# Patient Record
Sex: FEMALE | Race: WHITE | Employment: UNEMPLOYED | URBAN - METROPOLITAN AREA
[De-identification: names, ages, dates, MRNs, and addresses within clinical notes are randomized per-mention and may not be internally consistent; named-entity substitution may affect disease eponyms.]

---

## 2018-08-02 ENCOUNTER — RECORDS - HEALTHEAST (OUTPATIENT)
Dept: LAB | Facility: CLINIC | Age: 28
End: 2018-08-02

## 2018-08-02 LAB — TSH SERPL DL<=0.005 MIU/L-ACNC: 2.41 UIU/ML (ref 0.3–5)

## 2018-09-17 ENCOUNTER — THERAPY VISIT (OUTPATIENT)
Dept: PHYSICAL THERAPY | Facility: CLINIC | Age: 28
End: 2018-09-17
Payer: COMMERCIAL

## 2018-09-17 DIAGNOSIS — M25.551 HIP PAIN, RIGHT: Primary | ICD-10-CM

## 2018-09-17 PROCEDURE — 97161 PT EVAL LOW COMPLEX 20 MIN: CPT | Mod: GP | Performed by: PHYSICAL THERAPIST

## 2018-09-17 PROCEDURE — 97530 THERAPEUTIC ACTIVITIES: CPT | Mod: GP | Performed by: PHYSICAL THERAPIST

## 2018-09-17 PROCEDURE — 97110 THERAPEUTIC EXERCISES: CPT | Mod: GP | Performed by: PHYSICAL THERAPIST

## 2018-09-17 ASSESSMENT — ACTIVITIES OF DAILY LIVING (ADL)
WALKING_APPROXIMATELY_10_MINUTES: MODERATE DIFFICULTY
WALKING_INITIALLY: MODERATE DIFFICULTY
GETTING_INTO_AND_OUT_OF_A_BATHTUB: NO DIFFICULTY AT ALL
DEEP_SQUATTING: MODERATE DIFFICULTY
GETTING_INTO_AND_OUT_OF_AN_AVERAGE_CAR: EXTREME DIFFICULTY
HOS_ADL_ITEM_SCORE_TOTAL: 33
WALKING_15_MINUTES_OR_GREATER: MODERATE DIFFICULTY
GOING_DOWN_1_FLIGHT_OF_STAIRS: SLIGHT DIFFICULTY
WALKING_UP_STEEP_HILLS: NO DIFFICULTY AT ALL
HOW_WOULD_YOU_RATE_YOUR_CURRENT_LEVEL_OF_FUNCTION_DURING_YOUR_USUAL_ACTIVITIES_OF_DAILY_LIVING_FROM_0_TO_100_WITH_100_BEING_YOUR_LEVEL_OF_FUNCTION_PRIOR_TO_YOUR_HIP_PROBLEM_AND_0_BEING_THE_INABILITY_TO_PERFORM_ANY_OF_YOUR_USUAL_DAILY_ACTIVITIES?: -1
STEPPING_UP_AND_DOWN_CURBS: NO DIFFICULTY AT ALL
HOS_ADL_HIGHEST_POTENTIAL_SCORE: 48
GOING_UP_1_FLIGHT_OF_STAIRS: SLIGHT DIFFICULTY
WALKING_DOWN_STEEP_HILLS: NO DIFFICULTY AT ALL
HOS_ADL_COUNT: 12
PUTTING_ON_SOCKS_AND_SHOES: NO DIFFICULTY AT ALL
SITTING_FOR_15_MINUTES: NO DIFFICULTY AT ALL
STANDING_FOR_15_MINUTES: MODERATE DIFFICULTY

## 2018-09-17 NOTE — PROGRESS NOTES
[facility] Initial Evaluation        Subjective:  Patient is a 28 year old female presenting with rehab right hip hpi.   Génesis Esquivel is a 28 year old female with a right hip condition.  Condition occurred with:  Repetition/overuse (Pt. has had a gradual onset of R hip pain x 2 months w/out incident. Pt. has pain with prologned walking and running. Pt. has a hx of similar injury 8 years ago that resolved on it's own.).  Condition occurred: for unknown reasons.  This is a new condition  7-17-18.    Patient reports pain:  Posterior.  Radiates to:  No radiation.  Pain is described as sharp and is intermittent and reported as 7/10.  Associated symptoms:  Loss of strength. Pain is worse in the P.M..  Symptoms are exacerbated by running and walking and relieved by rest.  Since onset symptoms are unchanged.  Special testing: none.  Previous treatment: none.    General health as reported by patient is excellent.                                              Objective:  Standing Alignment:          Pelvic:  Normal  Hip:  Normal        Gait:  Slight limp on L  Gait Type:  Antalgic         Flexibility/Screens:   Positive screens:  HipNegative screens: Lumbar or SI Joint     Lower Extremity:      Decreased right lower extremity flexibility:  Piriformis                                                 Hip Evaluation  Hip PROM:              External Rotation: Left: 80    Right: 70              Hip Strength:    Flexion:   Right: 4/5   Pain:                    Extension:  Right: 4-/5    Pain:    Abduction:  Right: 4-/5    Pain:      External Rotation:  Right: 4/5   Pain:            Hip Special Testing:      Right hip negative for the following special tests:  Frandy; Fadir/Labrum or SLR    Hip Palpation:      Right hip tenderness present at:  Gluteus Medius  Functional Testing:          Quad:    Single leg squat:   Left:    Right:   Moderate loss of control                     General     ROS    Assessment/Plan:     Patient is a 28 year old female with right side hip complaints.    Patient has the following significant findings with corresponding treatment plan.                Diagnosis 1:  R hip pain  Pain -  self management and education  Decreased ROM/flexibility - manual therapy and therapeutic exercise  Decreased strength - therapeutic exercise and therapeutic activities  Impaired muscle performance - neuro re-education  Decreased function - therapeutic activities    Therapy Evaluation Codes:   1) History comprised of:   Personal factors that impact the plan of care:      None.    Comorbidity factors that impact the plan of care are:      None.     Medications impacting care: None.  2) Examination of Body Systems comprised of:   Body structures and functions that impact the plan of care:      Hip.   Activity limitations that impact the plan of care are:      Running and Walking.  3) Clinical presentation characteristics are:   Stable/Uncomplicated.  4) Decision-Making    Low complexity using standardized patient assessment instrument and/or measureable assessment of functional outcome.  Cumulative Therapy Evaluation is: Low complexity.    Previous and current functional limitations:  (See Goal Flow Sheet for this information)    Short term and Long term goals: (See Goal Flow Sheet for this information)     Communication ability:  Patient appears to be able to clearly communicate and understand verbal and written communication and follow directions correctly.  Treatment Explanation - The following has been discussed with the patient:   RX ordered/plan of care  Anticipated outcomes  Possible risks and side effects  This patient would benefit from PT intervention to resume normal activities.   Rehab potential is good.    Frequency:  2 X a month, once daily  Duration:  for 2 months  Discharge Plan:  Achieve all LTG.  Independent in home treatment program.  Reach maximal therapeutic benefit.    Please refer to the daily  flowsheet for treatment today, total treatment time and time spent performing 1:1 timed codes.

## 2018-09-17 NOTE — MR AVS SNAPSHOT
After Visit Summary   9/17/2018    Génesis Esquivel    MRN: [id number]           Patient Information     Date Of Birth          1990        Visit Information        Provider Department      9/17/2018 8:10 AM Peri Peters PT [facility] Physical Therapy        Today's Diagnoses     Hip pain, right    -  1       Follow-ups after your visit        Your next 10 appointments already scheduled     Oct 08, 2018  8:10 AM CDT   MAXIMO Extremity with Peri Peters PT   [facility] Physical Therapy ([facility] [place]  )    [address] [place] [id number] [phone]              Who to contact     If you have questions or need follow up information about today's clinic visit or your schedule please contact [facility] PHYSICAL THERAPY directly at [phone].  Normal or non-critical lab and imaging results will be communicated to you by MyChart, letter or phone within 4 business days after the clinic has received the results. If you do not hear from us within 7 days, please contact the clinic through MyChart or phone. If you have a critical or abnormal lab result, we will notify you by phone as soon as possible.  Submit refill requests through [employer] or call your pharmacy and they will forward the refill request to us. Please allow 3 business days for your refill to be completed.          Additional Information About Your Visit        Care EveryWhere ID     This is your Care EveryWhere ID. This could be used by other organizations to access your [facility] medical records  [id number]         Blood Pressure from Last 3 Encounters:   No data found for BP    Weight from Last 3 Encounters:   No data found for Wt              We Performed the Following     MAXIMO Inital Eval Report     PT Eval, Low Complexity (27328)     Therapeutic Activities     Therapeutic Exercises        Primary Care Provider     None Specified       No primary provider on file.        Equal Access to Services     BOBBY PAYNE : Hadii chelo Bustos, cindy mcgraw, jeevan pastor. So [facility] [phone].    ATENCIÓN: Si habla español, tiene a bill disposición servicios gratuitos de asistencia lingüística. Llame al [phone].    We comply with applicable federal civil rights laws and [place] laws. We do not discriminate on the basis of race, color, national origin, age, disability, sex, sexual orientation, or gender identity.            Thank you!     Thank you for choosing [place] [facility] PHYSICAL THERAP[facility]  for your care. Our goal is always to provide you with excellent care. Hearing back from our patients is one way we can continue to improve our services. Please take a few minutes to complete the written survey that you may receive in the mail after your visit with us. Thank you!             Your Updated Medication List - Protect others around you: Learn how to safely use, store and throw away your medicines at www.disposemymeds.org.      Notice  As of 9/17/2018 10:30 AM    You have not been prescribed any medications.

## 2018-09-17 NOTE — LETTER
[facility] PHYSICAL THERAPY   [address] [address]  [phone]    2018    Re: Génesis Esquivel   :   1990  MRN:  [id number]   REFERRING PHYSICIAN:   Caitlyn Soriano    [facility]THLET[facility] PHYSICAL [facility]  Date of Initial Evaluation:  18  Visits:  Rxs Used: 1  Reason for Referral:  Hip pain, right    EVALUATION SUMMARY    [facility]    Subjective:  Patient is a 28 year old female presenting with rehab right hip hpi.   Génesis Esquivel is a 28 year old female with a right hip condition.  Condition occurred with:  Repetition/overuse (Pt. has had a gradual onset of R hip pain x 2 months w/out incident. Pt. has pain with prologned walking and running. Pt. has a hx of similar injury 8 years ago that resolved on it's own.).  Condition occurred: for unknown reasons.  This is a new condition  18.    Patient reports pain:  Posterior.  Radiates to:  No radiation.  Pain is described as sharp and is intermittent and reported as 7/10.  Associated symptoms:  Loss of strength. Pain is worse in the P.M..  Symptoms are exacerbated by running and walking and relieved by rest.  Since onset symptoms are unchanged.  Special testing: none.  Previous treatment: none.    General health as reported by patient is excellent.  Pertinent medical history includes:  Currently pregnant, mental illness, numbness/tingling, smoking and thyroid problems.  Medical allergies: no.  Other surgeries include:  None reported.  Current medications:  Pain medication, anti-seizure medication and thyroid medication.  Current occupation is production.    Primary job tasks include:  Lifting, operating a machine, prolonged standing and repetitive tasks.                Objective:  Standing Alignment:    Pelvic:  Normal  Hip:  Normal    Gait:  Slight limp on L  Gait Type:  Antalgic       Flexibility/Screens:   Positive  screens:  HipNegative screens: Lumbar or SI Joint     Lower Extremity:  Decreased right lower extremity flexibility:  Piriformis    Hip Evaluation  Hip PROM:    External Rotation: Left: 80    Right: 70           Re: Génesis [place]ge     Hip Strength:    Flexion:   Right: 4/5   Pain:  Extension:  Right: 4-/5    Pain:    Abduction:  Right: 4-/5    Pain:  External Rotation:  Right: 4/5   Pain:      Hip Special Testing:    Right hip negative for the following special tests:  Frandy; Fadir/Labrum or SLR      Hip Palpation:    Right hip tenderness present at:  Gluteus Medius    Functional Testing:    Quad:    Single leg squat:   Left:    Right:   Moderate loss of control    Assessment/Plan:    Patient is a 28 year old female with right side hip complaints.    Patient has the following significant findings with corresponding treatment plan.                Diagnosis 1:  R hip pain  Pain -  self management and education  Decreased ROM/flexibility - manual therapy and therapeutic exercise  Decreased strength - therapeutic exercise and therapeutic activities  Impaired muscle performance - neuro re-education  Decreased function - therapeutic activities    Therapy Evaluation Codes:   1) History comprised of:   Personal factors that impact the plan of care:      None.    Comorbidity factors that impact the plan of care are:      None.     Medications impacting care: None.  2) Examination of Body Systems comprised of:   Body structures and functions that impact the plan of care:      Hip.   Activity limitations that impact the plan of care are:      Running and Walking.  3) Clinical presentation characteristics are:   Stable/Uncomplicated.  4) Decision-Making    Low complexity using standardized patient assessment instrument and/or measureable assessment of functional outcome.  Cumulative Therapy Evaluation is: Low complexity.    Previous and current functional limitations:  (See Goal Flow Sheet for this information)    Short term and  Long term goals: (See Goal Flow Sheet for this information)     Communication ability:  Patient appears to be able to clearly communicate and understand verbal and written communication and follow directions correctly.  Treatment Explanation - The following has been discussed with the patient:   RX ordered/plan of care  Anticipated outcomes  Possible risks and side effects    Re: Génesis Alvin     This patient would benefit from PT intervention to resume normal activities.   Rehab potential is good.    Frequency:  2 X a month, once daily  Duration:  for 2 months  Discharge Plan:  Achieve all LTG.  Independent in home treatment program.  Reach maximal therapeutic benefit.    Please refer to the daily flowsheet for treatment today, total treatment time and time spent performing 1:1 timed codes.     Thank you for your referral.    INQUIRIES  Therapist: Peri Peters, PT  [facility]  [address] [place] [address]  Phone: [phone]  Fax: [phone]

## 2018-09-18 ENCOUNTER — RECORDS - HEALTHEAST (OUTPATIENT)
Dept: LAB | Facility: CLINIC | Age: 28
End: 2018-09-18

## 2018-09-18 LAB — HCG SERPL-ACNC: 4982 MLU/ML (ref 0–4)

## 2018-09-18 NOTE — PROGRESS NOTES
[place] [facility] Initial Evaluation  Subjective:  Patient is a 28 year old female presenting with rehab left ankle/foot hpi.                                      Pertinent medical history includes:  Currently pregnant, mental illness, numbness/tingling, smoking and thyroid problems.  Medical allergies: no.  Other surgeries include:  None reported.  Current medications:  Pain medication, anti-seizure medication and thyroid medication.  Current occupation is production.    Primary job tasks include:  Lifting, operating a machine, prolonged standing and repetitive tasks.                                Objective:  System    Physical Exam    General     ROS    Assessment/Plan:

## 2018-09-20 ENCOUNTER — RECORDS - HEALTHEAST (OUTPATIENT)
Dept: LAB | Facility: CLINIC | Age: 28
End: 2018-09-20

## 2018-09-20 LAB — HCG SERPL-ACNC: 8227 MLU/ML (ref 0–4)

## 2018-09-27 ASSESSMENT — MIFFLIN-ST. JEOR: SCORE: 1543.22

## 2018-09-28 ENCOUNTER — SURGERY - HEALTHEAST (OUTPATIENT)
Dept: SURGERY | Facility: HOSPITAL | Age: 28
End: 2018-09-28

## 2018-09-28 ENCOUNTER — ANESTHESIA - HEALTHEAST (OUTPATIENT)
Dept: SURGERY | Facility: HOSPITAL | Age: 28
End: 2018-09-28

## 2019-09-16 ENCOUNTER — OFFICE VISIT (OUTPATIENT)
Dept: FAMILY MEDICINE | Facility: CLINIC | Age: 29
End: 2019-09-16
Payer: COMMERCIAL

## 2019-09-16 VITALS
TEMPERATURE: 98.6 F | RESPIRATION RATE: 16 BRPM | HEIGHT: 69 IN | SYSTOLIC BLOOD PRESSURE: 129 MMHG | BODY MASS INDEX: 25.39 KG/M2 | DIASTOLIC BLOOD PRESSURE: 77 MMHG | HEART RATE: 77 BPM | OXYGEN SATURATION: 100 % | WEIGHT: 171.4 LBS

## 2019-09-16 DIAGNOSIS — F19.10 SUBSTANCE ABUSE (H): ICD-10-CM

## 2019-09-16 DIAGNOSIS — E03.9 HYPOTHYROIDISM, UNSPECIFIED TYPE: ICD-10-CM

## 2019-09-16 DIAGNOSIS — M54.41 CHRONIC MIDLINE LOW BACK PAIN WITH RIGHT-SIDED SCIATICA: ICD-10-CM

## 2019-09-16 DIAGNOSIS — Z00.00 ROUTINE GENERAL MEDICAL EXAMINATION AT A HEALTH CARE FACILITY: Primary | ICD-10-CM

## 2019-09-16 DIAGNOSIS — R87.619 ABNORMAL CERVICAL PAPANICOLAOU SMEAR, UNSPECIFIED ABNORMAL PAP FINDING: ICD-10-CM

## 2019-09-16 DIAGNOSIS — Z11.3 SCREEN FOR STD (SEXUALLY TRANSMITTED DISEASE): ICD-10-CM

## 2019-09-16 DIAGNOSIS — G89.29 CHRONIC MIDLINE LOW BACK PAIN WITH RIGHT-SIDED SCIATICA: ICD-10-CM

## 2019-09-16 LAB
BASOPHILS # BLD AUTO: 0 10E9/L (ref 0–0.2)
BASOPHILS NFR BLD AUTO: 0.2 %
DIFFERENTIAL METHOD BLD: NORMAL
EOSINOPHIL # BLD AUTO: 0.1 10E9/L (ref 0–0.7)
EOSINOPHIL NFR BLD AUTO: 1.1 %
ERYTHROCYTE [DISTWIDTH] IN BLOOD BY AUTOMATED COUNT: 13.2 % (ref 10–15)
HCT VFR BLD AUTO: 40.8 % (ref 35–47)
HGB BLD-MCNC: 13.6 G/DL (ref 11.7–15.7)
LYMPHOCYTES # BLD AUTO: 2.3 10E9/L (ref 0.8–5.3)
LYMPHOCYTES NFR BLD AUTO: 28.9 %
MCH RBC QN AUTO: 27.3 PG (ref 26.5–33)
MCHC RBC AUTO-ENTMCNC: 33.3 G/DL (ref 31.5–36.5)
MCV RBC AUTO: 82 FL (ref 78–100)
MONOCYTES # BLD AUTO: 0.7 10E9/L (ref 0–1.3)
MONOCYTES NFR BLD AUTO: 8.4 %
NEUTROPHILS # BLD AUTO: 4.9 10E9/L (ref 1.6–8.3)
NEUTROPHILS NFR BLD AUTO: 61.4 %
PLATELET # BLD AUTO: 266 10E9/L (ref 150–450)
RBC # BLD AUTO: 4.98 10E12/L (ref 3.8–5.2)
WBC # BLD AUTO: 8.1 10E9/L (ref 4–11)

## 2019-09-16 PROCEDURE — 86706 HEP B SURFACE ANTIBODY: CPT | Performed by: PHYSICIAN ASSISTANT

## 2019-09-16 PROCEDURE — 87591 N.GONORRHOEAE DNA AMP PROB: CPT | Performed by: PHYSICIAN ASSISTANT

## 2019-09-16 PROCEDURE — G0499 HEPB SCREEN HIGH RISK INDIV: HCPCS | Performed by: PHYSICIAN ASSISTANT

## 2019-09-16 PROCEDURE — 86709 HEPATITIS A IGM ANTIBODY: CPT | Performed by: PHYSICIAN ASSISTANT

## 2019-09-16 PROCEDURE — 99385 PREV VISIT NEW AGE 18-39: CPT | Performed by: PHYSICIAN ASSISTANT

## 2019-09-16 PROCEDURE — 36415 COLL VENOUS BLD VENIPUNCTURE: CPT | Performed by: PHYSICIAN ASSISTANT

## 2019-09-16 PROCEDURE — 86708 HEPATITIS A ANTIBODY: CPT | Performed by: PHYSICIAN ASSISTANT

## 2019-09-16 PROCEDURE — 84443 ASSAY THYROID STIM HORMONE: CPT | Performed by: PHYSICIAN ASSISTANT

## 2019-09-16 PROCEDURE — 87491 CHLMYD TRACH DNA AMP PROBE: CPT | Performed by: PHYSICIAN ASSISTANT

## 2019-09-16 PROCEDURE — 80053 COMPREHEN METABOLIC PANEL: CPT | Performed by: PHYSICIAN ASSISTANT

## 2019-09-16 PROCEDURE — 86780 TREPONEMA PALLIDUM: CPT | Performed by: PHYSICIAN ASSISTANT

## 2019-09-16 PROCEDURE — 87389 HIV-1 AG W/HIV-1&-2 AB AG IA: CPT | Performed by: PHYSICIAN ASSISTANT

## 2019-09-16 PROCEDURE — 85025 COMPLETE CBC W/AUTO DIFF WBC: CPT | Performed by: PHYSICIAN ASSISTANT

## 2019-09-16 PROCEDURE — 86803 HEPATITIS C AB TEST: CPT | Performed by: PHYSICIAN ASSISTANT

## 2019-09-16 ASSESSMENT — MIFFLIN-ST. JEOR: SCORE: 1556.85
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
## 2019-09-17 LAB
ALBUMIN SERPL-MCNC: 4.4 G/DL (ref 3.4–5)
ALP SERPL-CCNC: 72 U/L (ref 40–150)
ALT SERPL W P-5'-P-CCNC: 22 U/L (ref 0–50)
ANION GAP SERPL CALCULATED.3IONS-SCNC: 6 MMOL/L (ref 3–14)
AST SERPL W P-5'-P-CCNC: 25 U/L (ref 0–45)
BILIRUB SERPL-MCNC: 0.2 MG/DL (ref 0.2–1.3)
BUN SERPL-MCNC: 21 MG/DL (ref 7–30)
CALCIUM SERPL-MCNC: 9.3 MG/DL (ref 8.5–10.1)
CHLORIDE SERPL-SCNC: 104 MMOL/L (ref 94–109)
CO2 SERPL-SCNC: 27 MMOL/L (ref 20–32)
CREAT SERPL-MCNC: 0.77 MG/DL (ref 0.52–1.04)
GFR SERPL CREATININE-BSD FRML MDRD: >90 ML/MIN/{1.73_M2}
GLUCOSE SERPL-MCNC: 88 MG/DL (ref 70–99)
POTASSIUM SERPL-SCNC: 4.5 MMOL/L (ref 3.4–5.3)
PROT SERPL-MCNC: 7.6 G/DL (ref 6.8–8.8)
SODIUM SERPL-SCNC: 137 MMOL/L (ref 133–144)
TSH SERPL DL<=0.005 MIU/L-ACNC: 1.79 MU/L (ref 0.4–4)

## 2019-09-18 LAB
C TRACH DNA SPEC QL NAA+PROBE: NEGATIVE
HAV IGG SER QL IA: NONREACTIVE
HAV IGM SERPL QL IA: NONREACTIVE
HBV SURFACE AB SERPL IA-ACNC: >1000 M[IU]/ML
HBV SURFACE AG SERPL QL IA: NONREACTIVE
HCV AB SERPL QL IA: NONREACTIVE
HIV 1+2 AB+HIV1 P24 AG SERPL QL IA: NONREACTIVE
N GONORRHOEA DNA SPEC QL NAA+PROBE: NEGATIVE
SPECIMEN SOURCE: NORMAL
SPECIMEN SOURCE: NORMAL
T PALLIDUM AB SER QL: NONREACTIVE

## 2019-09-19 ENCOUNTER — TELEPHONE (OUTPATIENT)
Dept: FAMILY MEDICINE | Facility: CLINIC | Age: 29
End: 2019-09-19

## 2019-09-19 NOTE — TELEPHONE ENCOUNTER
Pt at [facility]  Called general # [phone]   Asked staff to have her callback  Tiffanie GRANGER RN

## 2019-09-19 NOTE — TELEPHONE ENCOUNTER
Notes recorded by Mustapha Gonzalez PA-C on 9/19/2019 at 3:44 PM CDT  Please call with results.    All normal results, negative for all STDs.  Is immune to Hep B    Liang Gonzalez PA-C

## 2019-09-19 NOTE — RESULT ENCOUNTER NOTE
Please call with results.    All normal results, negative for all STDs.  Is immune to Hep B    Liang Gonzalez PA-C

## 2019-10-01 ENCOUNTER — TRANSFERRED RECORDS (OUTPATIENT)
Dept: HEALTH INFORMATION MANAGEMENT | Facility: CLINIC | Age: 29
End: 2019-10-01

## 2019-10-01 LAB — PAP SMEAR - HIM PATIENT REPORTED: POSITIVE

## 2020-02-07 ENCOUNTER — RECORDS - HEALTHEAST (OUTPATIENT)
Dept: ADMINISTRATIVE | Facility: OTHER | Age: 30
End: 2020-02-07

## 2020-03-01 ENCOUNTER — RECORDS - HEALTHEAST (OUTPATIENT)
Dept: ADMINISTRATIVE | Facility: OTHER | Age: 30
End: 2020-03-01

## 2020-03-30 ENCOUNTER — DOCUMENTATION ONLY (OUTPATIENT)
Dept: FAMILY MEDICINE | Facility: CLINIC | Age: 30
End: 2020-03-30

## 2020-03-30 NOTE — PROGRESS NOTES
Please abstract the following data from this visit with this patient into the appropriate field in Epic:    Tests that can be patient reported without a hard copy:    Pap smear done on this date: Oct 2019 (approximately), by this group: [facility], results were LSIL +HPV.     Other Tests found in the patient's chart through Chart Review/Care Everywhere:        Note to Abstraction: If this section is blank, no results were found via Chart Review/Care Everywhere.

## 2021-03-24 ENCOUNTER — PATIENT OUTREACH (OUTPATIENT)
Dept: CARE COORDINATION | Facility: CLINIC | Age: 31
End: 2021-03-24

## 2021-03-24 DIAGNOSIS — Z71.89 OTHER SPECIFIED COUNSELING: Primary | Chronic | ICD-10-CM

## 2021-03-24 NOTE — LETTER
[facility] CARE COORDINATION  [facility]  [address]  [place] [address]  [phone]  March 26, 2021    Génesis Esquivel  [address] [place] [id number]      Dear Génesis,    I am a clinic community health worker who works with Mustapha Gonzalez PA-C at [facility]. I have been trying to reach you recently to introduce Clinic Care Coordination and to see if there was anything I could assist you with.  Below is a description of clinic care coordination and how I can further assist you.      The clinic care coordination team is made up of a registered nurse,  and community health worker who understand the health care system. The goal of clinic care coordination is to help you manage your health and improve access to the health care system in the most efficient manner. The team can assist you in meeting your health care goals by providing education, coordinating services, strengthening the communication among your providers and supporting you with any resource needs.    Please feel free to contact me at [phone] with any questions or concerns. We are focused on providing you with the highest-quality healthcare experience possible and that all starts with you.     Sincerely,     PRISCILA Deal

## 2021-03-24 NOTE — PROGRESS NOTES
CHW called the number listed in the chart, a representative from [facility] answered the phone and stated that they can't confirm or deny if the pt is there however, CHW left name and contact number just in case. If pt doesn't call back by  3/26/2021 this referral will be completed and unreachable letter will be mailed.

## 2021-05-18 ENCOUNTER — PATIENT OUTREACH (OUTPATIENT)
Dept: CARE COORDINATION | Facility: CLINIC | Age: 31
End: 2021-05-18

## 2021-05-18 DIAGNOSIS — Z71.89 OTHER SPECIFIED COUNSELING: Primary | Chronic | ICD-10-CM

## 2021-05-18 NOTE — LETTER
[facility] CARE COORDINATION  [address]  [facility] [id number]  May 18, 2021    Génesis Esquivel  [address] [place] [address]      Dear Génesis,    I am a clinic community health worker who works with Mustapha Gonzalez PA-C at [facility]. I have been trying to reach you recently to introduce Clinic Care Coordination and to see if there was anything I could assist you with.  Below is a description of clinic care coordination and how I can further assist you.      The clinic care coordination team is made up of a registered nurse,  and community health worker who understand the health care system. The goal of clinic care coordination is to help you manage your health and improve access to the health care system in the most efficient manner. The team can assist you in meeting your health care goals by providing education, coordinating services, strengthening the communication among your providers and supporting you with any resource needs.    Please feel free to contact me at [phone] with any questions or concerns. We are focused on providing you with the highest-quality healthcare experience possible and that all starts with you.     Sincerely,     PRISCILA Deal

## 2021-05-18 NOTE — PROGRESS NOTES
Clinic Care Coordination Contact  [facility]/Voicemail       Clinical Data: Care Coordinator Outreach  Outreach attempted x 1.  Unable to leave a message on patient's voicemail with call back information and requested return call.  Plan: Care Coordinator will send unable to contact letter with care coordinator contact information via mail. Care Coordinator will do no further outreaches at this time.

## 2021-06-02 VITALS — BODY MASS INDEX: 26.22 KG/M2 | WEIGHT: 173 LBS | HEIGHT: 68 IN

## 2021-06-20 NOTE — ANESTHESIA POSTPROCEDURE EVALUATION
Patient: Génesis YU Ohlrognena  SUCTION DILATION AND CURETTAGE, INSERTION OF INTRAUTERINE DEVICE  Anesthesia type: MAC    Patient location: Phase II Recovery  Last vitals:   Vitals:    09/28/18 1000   BP: 132/81   Pulse: (!) 56   Resp: 16   Temp:    SpO2: 100%     Post vital signs: stable  Level of consciousness: awake and responds to simple questions  Post-anesthesia pain: pain controlled  Post-anesthesia nausea and vomiting: no  Pulmonary: unassisted, return to baseline  Cardiovascular: stable and blood pressure at baseline  Hydration: adequate  Anesthetic events: no    QCDR Measures:  ASA# 11 - Crystal-op Cardiac Arrest: ASA11B - Patient did NOT experience unanticipated cardiac arrest  ASA# 12 - Crystal-op Mortality Rate: ASA12B - Patient did NOT die  ASA# 13 - PACU Re-Intubation Rate: ASA13B - Patient did NOT require a new airway mgmt  ASA# 10 - Composite Anes Safety: ASA10A - No serious adverse event    Additional Notes:

## 2021-06-20 NOTE — ANESTHESIA CARE TRANSFER NOTE
Last vitals:   Vitals:    09/28/18 0827   BP: 115/72   Pulse: (!) 53   Resp: 15   Temp: 36.4  C (97.6  F)   SpO2: 100%     Patient's level of consciousness is drowsy  Spontaneous respirations: yes  Maintains airway independently: yes  Dentition unchanged: yes  Oropharynx: oropharynx clear of all foreign objects    QCDR Measures:  ASA# 20 - Surgical Safety Checklist: WHO surgical safety checklist completed prior to induction  PQRS# 430 - Adult PONV Prevention: 4558F - Pt received => 2 anti-emetic agents (different classes) preop & intraop  ASA# 8 - Peds PONV Prevention: NA - Not pediatric patient, not GA or 2 or more risk factors NOT present  PQRS# 424 - Crystal-op Temp Management: 4559F - At least one body temp DOCUMENTED => 35.5C or 95.9F within required timeframe  PQRS# 426 - PACU Transfer Protocol: - Transfer of care checklist used  ASA# 14 - Acute Post-op Pain: ASA14B - Patient did NOT experience pain >= 7 out of 10

## 2021-06-20 NOTE — ANESTHESIA PREPROCEDURE EVALUATION
Anesthesia Evaluation      Patient summary reviewed     Airway   Mallampati: II   Pulmonary - negative ROS and normal exam   (+) a smoker                         Cardiovascular - negative ROS and normal exam   Neuro/Psych    (+) anxiety/panic attacks,     Comments: Bipolar disorder    Endo/Other - negative ROS      GI/Hepatic/Renal - negative ROS      Other findings: Hb 12.4      Dental - normal exam                        Anesthesia Plan  Planned anesthetic: MAC    ASA 2     Anesthetic plan and risks discussed with: patient    Post-op plan: routine recovery

## 2021-06-22 ENCOUNTER — MEDICAL CORRESPONDENCE (OUTPATIENT)
Dept: HEALTH INFORMATION MANAGEMENT | Facility: CLINIC | Age: 31
End: 2021-06-22

## 2021-06-22 ENCOUNTER — HOSPITAL ENCOUNTER (OUTPATIENT)
Facility: CLINIC | Age: 31
End: 2021-06-22
Attending: ORTHOPAEDIC SURGERY | Admitting: ORTHOPAEDIC SURGERY
Payer: COMMERCIAL

## 2021-06-22 DIAGNOSIS — Z11.59 ENCOUNTER FOR SCREENING FOR OTHER VIRAL DISEASES: ICD-10-CM

## 2021-07-06 RX ORDER — CEFAZOLIN SODIUM 2 G/100ML
2 INJECTION, SOLUTION INTRAVENOUS
Status: CANCELLED | OUTPATIENT
Start: 2021-07-06

## 2021-07-06 RX ORDER — CEFAZOLIN SODIUM 2 G/100ML
2 INJECTION, SOLUTION INTRAVENOUS SEE ADMIN INSTRUCTIONS
Status: CANCELLED | OUTPATIENT
Start: 2021-07-06

## 2021-07-07 DIAGNOSIS — Z11.59 ENCOUNTER FOR SCREENING FOR OTHER VIRAL DISEASES: ICD-10-CM

## 2021-07-07 PROCEDURE — U0005 INFEC AGEN DETEC AMPLI PROBE: HCPCS | Performed by: ORTHOPAEDIC SURGERY

## 2021-07-07 PROCEDURE — U0003 INFECTIOUS AGENT DETECTION BY NUCLEIC ACID (DNA OR RNA); SEVERE ACUTE RESPIRATORY SYNDROME CORONAVIRUS 2 (SARS-COV-2) (CORONAVIRUS DISEASE [COVID-19]), AMPLIFIED PROBE TECHNIQUE, MAKING USE OF HIGH THROUGHPUT TECHNOLOGIES AS DESCRIBED BY CMS-2020-01-R: HCPCS | Performed by: ORTHOPAEDIC SURGERY

## 2021-07-07 RX ORDER — PREGABALIN 150 MG/1
150 CAPSULE ORAL 2 TIMES DAILY
COMMUNITY
End: 2021-07-08 | Stop reason: HOSPADM

## 2021-07-07 RX ORDER — NAPROXEN 500 MG/1
500 TABLET ORAL 2 TIMES DAILY PRN
COMMUNITY
End: 2021-07-08 | Stop reason: HOSPADM

## 2021-07-07 RX ORDER — FLUTICASONE PROPIONATE 50 MCG
1 SPRAY, SUSPENSION (ML) NASAL DAILY PRN
COMMUNITY
End: 2021-07-08 | Stop reason: HOSPADM

## 2021-07-08 ENCOUNTER — RECORDS - HEALTHEAST (OUTPATIENT)
Dept: ADMINISTRATIVE | Facility: OTHER | Age: 31
End: 2021-07-08

## 2021-07-08 DIAGNOSIS — Z11.59 ENCOUNTER FOR SCREENING FOR OTHER VIRAL DISEASES: ICD-10-CM

## 2021-07-08 LAB
LABORATORY COMMENT REPORT: NORMAL
SARS-COV-2 RNA RESP QL NAA+PROBE: NEGATIVE
SARS-COV-2 RNA RESP QL NAA+PROBE: NORMAL
SPECIMEN SOURCE: NORMAL
SPECIMEN SOURCE: NORMAL

## 2021-07-09 ENCOUNTER — AMBULATORY - HEALTHEAST (OUTPATIENT)
Dept: LAB | Facility: CLINIC | Age: 31
End: 2021-07-09

## 2021-07-09 ENCOUNTER — COMMUNICATION - HEALTHEAST (OUTPATIENT)
Dept: SCHEDULING | Facility: CLINIC | Age: 31
End: 2021-07-09

## 2021-07-09 DIAGNOSIS — Z11.59 SPECIAL SCREENING EXAMINATION FOR VIRAL DISEASE: ICD-10-CM

## 2021-07-09 NOTE — TELEPHONE ENCOUNTER
Telephone Encounter by Vannesa Ny RN at 7/9/2021  9:12 AM     Author: Vannesa Ny RN Service: -- Author Type: Registered Nurse    Filed: 7/9/2021  9:14 AM Encounter Date: 7/9/2021 Status: Signed    : Vannesa Ny RN (Registered Nurse)       Pt calls to request appointment time today for covid testing.  Also asks how early to come (?)  Also asks for address of [facility] where appt is located.    Provided all info per pt's request.  Pt verbalizes understanding.  Confirms appt time and location.  No further questions at this time.    Roya Ny RN  [facility] Triage     Additional Information  ? General information question, no triage required and triager able to answer question    Protocols used: INFORMATION ONLY CALL-A-AH

## 2021-07-10 ENCOUNTER — COMMUNICATION - HEALTHEAST (OUTPATIENT)
Dept: SCHEDULING | Facility: CLINIC | Age: 31
End: 2021-07-10

## 2021-07-10 LAB
SARS-COV-2 PCR COMMENT: NORMAL
SARS-COV-2 RNA SPEC QL NAA+PROBE: NEGATIVE
SARS-COV-2 VIRUS SPECIMEN SOURCE: NORMAL

## 2021-07-12 RX ORDER — NAPROXEN 500 MG/1
500 TABLET ORAL 2 TIMES DAILY PRN
COMMUNITY

## 2021-07-12 RX ORDER — BUSPIRONE HYDROCHLORIDE 15 MG/1
15 TABLET ORAL 2 TIMES DAILY
COMMUNITY

## 2021-07-12 RX ORDER — CARBAMAZEPINE 300 MG/1
300 CAPSULE, EXTENDED RELEASE ORAL 2 TIMES DAILY
COMMUNITY

## 2021-07-12 RX ORDER — FLUTICASONE PROPIONATE 50 MCG
1 SPRAY, SUSPENSION (ML) NASAL DAILY
COMMUNITY

## 2021-07-13 ENCOUNTER — ANESTHESIA (OUTPATIENT)
Dept: SURGERY | Facility: CLINIC | Age: 31
End: 2021-07-13
Payer: COMMERCIAL

## 2021-07-13 ENCOUNTER — ANESTHESIA EVENT (OUTPATIENT)
Dept: SURGERY | Facility: CLINIC | Age: 31
End: 2021-07-13
Payer: COMMERCIAL

## 2021-07-13 ENCOUNTER — HOSPITAL ENCOUNTER (OUTPATIENT)
Facility: CLINIC | Age: 31
Discharge: HOME | End: 2021-07-13
Attending: ORTHOPAEDIC SURGERY | Admitting: ORTHOPAEDIC SURGERY
Payer: COMMERCIAL

## 2021-07-13 ENCOUNTER — APPOINTMENT (OUTPATIENT)
Dept: GENERAL RADIOLOGY | Facility: CLINIC | Age: 31
End: 2021-07-13
Attending: ORTHOPAEDIC SURGERY
Payer: COMMERCIAL

## 2021-07-13 VITALS
WEIGHT: 159.9 LBS | OXYGEN SATURATION: 100 % | RESPIRATION RATE: 14 BRPM | SYSTOLIC BLOOD PRESSURE: 116 MMHG | HEIGHT: 68 IN | DIASTOLIC BLOOD PRESSURE: 68 MMHG | TEMPERATURE: 96.9 F | HEART RATE: 67 BPM | BODY MASS INDEX: 24.23 KG/M2

## 2021-07-13 DIAGNOSIS — S93.325S LISFRANC'S DISLOCATION, LEFT, SEQUELA: Primary | ICD-10-CM

## 2021-07-13 LAB — HCG UR QL: NEGATIVE

## 2021-07-13 PROCEDURE — 250N000013 HC RX MED GY IP 250 OP 250 PS 637: Performed by: PHYSICIAN ASSISTANT

## 2021-07-13 PROCEDURE — 370N000017 HC ANESTHESIA TECHNICAL FEE, PER MIN: Performed by: ORTHOPAEDIC SURGERY

## 2021-07-13 PROCEDURE — 999N000141 HC STATISTIC PRE-PROCEDURE NURSING ASSESSMENT: Performed by: ORTHOPAEDIC SURGERY

## 2021-07-13 PROCEDURE — 250N000009 HC RX 250: Performed by: ORTHOPAEDIC SURGERY

## 2021-07-13 PROCEDURE — 258N000003 HC RX IP 258 OP 636: Performed by: ANESTHESIOLOGY

## 2021-07-13 PROCEDURE — 250N000025 HC SEVOFLURANE, PER MIN: Performed by: ORTHOPAEDIC SURGERY

## 2021-07-13 PROCEDURE — 250N000009 HC RX 250: Performed by: NURSE ANESTHETIST, CERTIFIED REGISTERED

## 2021-07-13 PROCEDURE — 360N000084 HC SURGERY LEVEL 4 W/ FLUORO, PER MIN: Performed by: ORTHOPAEDIC SURGERY

## 2021-07-13 PROCEDURE — 250N000011 HC RX IP 250 OP 636: Performed by: ANESTHESIOLOGY

## 2021-07-13 PROCEDURE — 272N000001 HC OR GENERAL SUPPLY STERILE: Performed by: ORTHOPAEDIC SURGERY

## 2021-07-13 PROCEDURE — 250N000009 HC RX 250: Performed by: ANESTHESIOLOGY

## 2021-07-13 PROCEDURE — 81025 URINE PREGNANCY TEST: CPT | Performed by: ANESTHESIOLOGY

## 2021-07-13 PROCEDURE — 710N000009 HC RECOVERY PHASE 1, LEVEL 1, PER MIN: Performed by: ORTHOPAEDIC SURGERY

## 2021-07-13 PROCEDURE — C1713 ANCHOR/SCREW BN/BN,TIS/BN: HCPCS | Performed by: ORTHOPAEDIC SURGERY

## 2021-07-13 PROCEDURE — 999N000179 XR SURGERY CARM FLUORO LESS THAN 5 MIN W STILLS

## 2021-07-13 PROCEDURE — 271N000001 HC OR GENERAL SUPPLY NON-STERILE: Performed by: ORTHOPAEDIC SURGERY

## 2021-07-13 PROCEDURE — 250N000011 HC RX IP 250 OP 636: Performed by: NURSE ANESTHETIST, CERTIFIED REGISTERED

## 2021-07-13 PROCEDURE — 710N000012 HC RECOVERY PHASE 2, PER MINUTE: Performed by: ORTHOPAEDIC SURGERY

## 2021-07-13 PROCEDURE — 250N000011 HC RX IP 250 OP 636: Performed by: PHYSICIAN ASSISTANT

## 2021-07-13 RX ORDER — ONDANSETRON 4 MG/1
4 TABLET, ORALLY DISINTEGRATING ORAL EVERY 30 MIN PRN
Status: DISCONTINUED | OUTPATIENT
Start: 2021-07-13 | End: 2021-07-13 | Stop reason: HOSPADM

## 2021-07-13 RX ORDER — AMOXICILLIN 250 MG
1-2 CAPSULE ORAL 2 TIMES DAILY
Qty: 12 TABLET | Refills: 0 | Status: SHIPPED | OUTPATIENT
Start: 2021-07-13

## 2021-07-13 RX ORDER — HYDROXYZINE HYDROCHLORIDE 25 MG/1
25 TABLET, FILM COATED ORAL
Status: DISCONTINUED | OUTPATIENT
Start: 2021-07-13 | End: 2021-07-13 | Stop reason: HOSPADM

## 2021-07-13 RX ORDER — ACETAMINOPHEN 500 MG
1000 TABLET ORAL EVERY 4 HOURS PRN
COMMUNITY
Start: 2021-07-13

## 2021-07-13 RX ORDER — MAGNESIUM HYDROXIDE 1200 MG/15ML
LIQUID ORAL PRN
Status: DISCONTINUED | OUTPATIENT
Start: 2021-07-13 | End: 2021-07-13 | Stop reason: HOSPADM

## 2021-07-13 RX ORDER — HYDROMORPHONE HCL IN WATER/PF 6 MG/30 ML
0.2 PATIENT CONTROLLED ANALGESIA SYRINGE INTRAVENOUS EVERY 5 MIN PRN
Status: ACTIVE | OUTPATIENT
Start: 2021-07-13 | End: 2021-07-13

## 2021-07-13 RX ORDER — LABETALOL HYDROCHLORIDE 5 MG/ML
10 INJECTION, SOLUTION INTRAVENOUS EVERY 10 MIN PRN
Status: DISCONTINUED | OUTPATIENT
Start: 2021-07-13 | End: 2021-07-13 | Stop reason: HOSPADM

## 2021-07-13 RX ORDER — LIDOCAINE HYDROCHLORIDE 20 MG/ML
INJECTION, SOLUTION INFILTRATION; PERINEURAL PRN
Status: DISCONTINUED | OUTPATIENT
Start: 2021-07-13 | End: 2021-07-13

## 2021-07-13 RX ORDER — PROPOFOL 10 MG/ML
INJECTION, EMULSION INTRAVENOUS PRN
Status: DISCONTINUED | OUTPATIENT
Start: 2021-07-13 | End: 2021-07-13

## 2021-07-13 RX ORDER — SODIUM CHLORIDE, SODIUM LACTATE, POTASSIUM CHLORIDE, CALCIUM CHLORIDE 600; 310; 30; 20 MG/100ML; MG/100ML; MG/100ML; MG/100ML
INJECTION, SOLUTION INTRAVENOUS CONTINUOUS
Status: DISCONTINUED | OUTPATIENT
Start: 2021-07-13 | End: 2021-07-13 | Stop reason: HOSPADM

## 2021-07-13 RX ORDER — OXYCODONE HYDROCHLORIDE 5 MG/1
5 TABLET ORAL
Status: COMPLETED | OUTPATIENT
Start: 2021-07-13 | End: 2021-07-13

## 2021-07-13 RX ORDER — ONDANSETRON 2 MG/ML
4 INJECTION INTRAMUSCULAR; INTRAVENOUS EVERY 30 MIN PRN
Status: DISCONTINUED | OUTPATIENT
Start: 2021-07-13 | End: 2021-07-13 | Stop reason: HOSPADM

## 2021-07-13 RX ORDER — PROPOFOL 10 MG/ML
INJECTION, EMULSION INTRAVENOUS CONTINUOUS PRN
Status: DISCONTINUED | OUTPATIENT
Start: 2021-07-13 | End: 2021-07-13

## 2021-07-13 RX ORDER — CEFAZOLIN SODIUM 2 G/100ML
2 INJECTION, SOLUTION INTRAVENOUS SEE ADMIN INSTRUCTIONS
Status: DISCONTINUED | OUTPATIENT
Start: 2021-07-13 | End: 2021-07-13 | Stop reason: HOSPADM

## 2021-07-13 RX ORDER — ACETAMINOPHEN 325 MG/1
650 TABLET ORAL
Status: DISCONTINUED | OUTPATIENT
Start: 2021-07-13 | End: 2021-07-13 | Stop reason: HOSPADM

## 2021-07-13 RX ORDER — FENTANYL CITRATE 0.05 MG/ML
50 INJECTION, SOLUTION INTRAMUSCULAR; INTRAVENOUS
Status: DISCONTINUED | OUTPATIENT
Start: 2021-07-13 | End: 2021-07-13 | Stop reason: HOSPADM

## 2021-07-13 RX ORDER — SCOLOPAMINE TRANSDERMAL SYSTEM 1 MG/1
1 PATCH, EXTENDED RELEASE TRANSDERMAL
Status: DISCONTINUED | OUTPATIENT
Start: 2021-07-13 | End: 2021-07-13 | Stop reason: HOSPADM

## 2021-07-13 RX ORDER — ONDANSETRON 4 MG/1
4 TABLET, ORALLY DISINTEGRATING ORAL
Status: DISCONTINUED | OUTPATIENT
Start: 2021-07-13 | End: 2021-07-13 | Stop reason: HOSPADM

## 2021-07-13 RX ORDER — CEFAZOLIN SODIUM 2 G/100ML
2 INJECTION, SOLUTION INTRAVENOUS
Status: DISCONTINUED | OUTPATIENT
Start: 2021-07-13 | End: 2021-07-13 | Stop reason: HOSPADM

## 2021-07-13 RX ORDER — DEXAMETHASONE SODIUM PHOSPHATE 4 MG/ML
INJECTION, SOLUTION INTRA-ARTICULAR; INTRALESIONAL; INTRAMUSCULAR; INTRAVENOUS; SOFT TISSUE PRN
Status: DISCONTINUED | OUTPATIENT
Start: 2021-07-13 | End: 2021-07-13

## 2021-07-13 RX ORDER — FENTANYL CITRATE 0.05 MG/ML
50 INJECTION, SOLUTION INTRAMUSCULAR; INTRAVENOUS EVERY 5 MIN PRN
Status: ACTIVE | OUTPATIENT
Start: 2021-07-13 | End: 2021-07-13

## 2021-07-13 RX ORDER — MEPERIDINE HYDROCHLORIDE 25 MG/ML
12.5 INJECTION INTRAMUSCULAR; INTRAVENOUS; SUBCUTANEOUS
Status: DISCONTINUED | OUTPATIENT
Start: 2021-07-13 | End: 2021-07-13 | Stop reason: HOSPADM

## 2021-07-13 RX ORDER — OXYCODONE HYDROCHLORIDE 5 MG/1
5-10 TABLET ORAL EVERY 4 HOURS PRN
Qty: 26 TABLET | Refills: 0 | Status: SHIPPED | OUTPATIENT
Start: 2021-07-13

## 2021-07-13 RX ORDER — ONDANSETRON 2 MG/ML
INJECTION INTRAMUSCULAR; INTRAVENOUS PRN
Status: DISCONTINUED | OUTPATIENT
Start: 2021-07-13 | End: 2021-07-13

## 2021-07-13 RX ADMIN — OXYCODONE HYDROCHLORIDE 5 MG: 5 TABLET ORAL at 16:07

## 2021-07-13 RX ADMIN — BUPIVACAINE HYDROCHLORIDE 30 ML: 5 INJECTION, SOLUTION EPIDURAL; INTRACAUDAL; PERINEURAL at 12:41

## 2021-07-13 RX ADMIN — FENTANYL CITRATE 25 MCG: 0.05 INJECTION, SOLUTION INTRAMUSCULAR; INTRAVENOUS at 13:15

## 2021-07-13 RX ADMIN — FENTANYL CITRATE 25 MCG: 0.05 INJECTION, SOLUTION INTRAMUSCULAR; INTRAVENOUS at 13:22

## 2021-07-13 RX ADMIN — FENTANYL CITRATE 25 MCG: 0.05 INJECTION, SOLUTION INTRAMUSCULAR; INTRAVENOUS at 14:32

## 2021-07-13 RX ADMIN — ONDANSETRON 4 MG: 2 INJECTION INTRAMUSCULAR; INTRAVENOUS at 13:07

## 2021-07-13 RX ADMIN — FENTANYL CITRATE 50 MCG: 0.05 INJECTION, SOLUTION INTRAMUSCULAR; INTRAVENOUS at 12:39

## 2021-07-13 RX ADMIN — CEFAZOLIN SODIUM 2 G: 2 INJECTION, SOLUTION INTRAVENOUS at 13:00

## 2021-07-13 RX ADMIN — PROPOFOL 100 MCG/KG/MIN: 10 INJECTION, EMULSION INTRAVENOUS at 13:07

## 2021-07-13 RX ADMIN — PROPOFOL 75 MCG/KG/MIN: 10 INJECTION, EMULSION INTRAVENOUS at 14:13

## 2021-07-13 RX ADMIN — FENTANYL CITRATE 25 MCG: 0.05 INJECTION, SOLUTION INTRAMUSCULAR; INTRAVENOUS at 14:26

## 2021-07-13 RX ADMIN — MIDAZOLAM 2 MG: 1 INJECTION INTRAMUSCULAR; INTRAVENOUS at 12:58

## 2021-07-13 RX ADMIN — MIDAZOLAM 2 MG: 1 INJECTION INTRAMUSCULAR; INTRAVENOUS at 12:38

## 2021-07-13 RX ADMIN — PROPOFOL 200 MG: 10 INJECTION, EMULSION INTRAVENOUS at 13:07

## 2021-07-13 RX ADMIN — DEXAMETHASONE SODIUM PHOSPHATE 4 MG: 4 INJECTION, SOLUTION INTRA-ARTICULAR; INTRALESIONAL; INTRAMUSCULAR; INTRAVENOUS; SOFT TISSUE at 13:07

## 2021-07-13 RX ADMIN — LIDOCAINE HYDROCHLORIDE 60 MG: 20 INJECTION, SOLUTION INFILTRATION; PERINEURAL at 13:07

## 2021-07-13 RX ADMIN — SODIUM CHLORIDE, POTASSIUM CHLORIDE, SODIUM LACTATE AND CALCIUM CHLORIDE: 600; 310; 30; 20 INJECTION, SOLUTION INTRAVENOUS at 12:58

## 2021-07-13 ASSESSMENT — ENCOUNTER SYMPTOMS: SEIZURES: 0

## 2021-07-13 ASSESSMENT — LIFESTYLE VARIABLES: TOBACCO_USE: 0

## 2021-07-13 ASSESSMENT — MIFFLIN-ST. JEOR: SCORE: 1478.8

## 2021-07-13 NOTE — OP NOTE
PREOPERATIVE DIAGNOSIS:   1.  Left subacute Lisfranc injury.  2.  Left hallux valgus deformity.    POSTOPERATIVE DIAGNOSIS:   1.  Left subacute Lisfranc injury.  2.  Left hallux valgus deformity.    PROCEDURE(S):   1.  ORIF left Lisfranc injury.  2.  Left Lapidus bunionectomy.  3.  Left Akin osteotomy.    ATTENDING SURGEON: Dr. Mustapha Aden.    ASSISTANT SURGEON: Oleksandr Harvey PA-C.    ANESTHESIA: Sedation with regional nerve block.    EBL: Minimal.    IMPLANTS: [id number] Phantom Lapidus nail with associated interlocking screws; [id number] 2.0mm and 4.0mm cannulated screws.    COMPLICATIONS: None apparent.    A skilled surgical assistant, Oleksandr Harvey PA-C, was necessary and utilized during the case to assist with patient positioning, prepping and draping, completing the soft tissue/bone work, wound closure, and dressing and splint application.  The assistant was utilized throughout the entire case.    INDICATIONS: Génesis is a pleasant 31 year-old female who presented to my clinic for evaluation of a left foot injury.  The patient sustained the injury while at work and physical examination and imaging studies demonstrated evidence of a Lisfranc injury with tiny avulsion fragments within the Lisfranc complex.  The patient also endorsed a chronic hallux valgus deformity which has become more irritable over the past number of months.  As we were anticipating proceeding with operative intervention for fixation of the Lisfranc injury, I also offered correction of the hallux valgus deformity at the same time.  Depending on the stability at the first TMT joint, we would either proceed with a distal first metatarsal correction or a Lapidus bunionectomy.  Operative intervention for fixation of Lisfranc injury was recommended to restore stable alignment of the midfoot and limit the risk of posttraumatic arthritis and deformity.  After full discussion of the benefits and risks of surgery, the patient provided informed consent  to proceed.    The patient was identified in the pre-operative holding area on the date of surgery.  The operative site was marked with indelible marker and the patient was brought back to the operating room and transferred to the operating table in a supine position.  All bony prominences were well-padded.  Anesthesia was administered without complication.  The right lower extremity was prepped and draped in standard sterile fashion.  A pre-operative timeout was performed identifying the correct patient, procedure, operative site, antibiotic administration, and equipment necessary for the procedure.    After Esmarch exsanguination, a supramalleolar tourniquet was secured.  A longitudinal incision was made over the dorsal aspect of the first webspace extending proximally over the intercuneiform joint.  Soft tissue dissection was carefully carried down maintaining excellent hemostasis and protecting the deep peroneal nerve.  Significant scar tissue had developed over the dorsal aspect of the Lisfranc joint.  The scar tissue was carefully excised and obvious instability was present at the Lisfranc joint with subtle lateral translation of the second metatarsal base.  The second TMT joint was inspected and noted to be pristine without significant chondral injury.  There was evidence of instability at the first TMT joint, and therefore decision was made to proceed with a Lapidus bunionectomy to address the hallux valgus deformity.  While addressing the first TMT joint, I would also provide permanent fixation between the bases of the first and second metatarsal and at the Lisfranc complex to hopefully achieve a stable arthrodesis of the Lisfranc complex and decrease risk of recurrent deformity and limit any need for further hardware removal.  The chondral tissue at the first TMT joint was carefully debrided.  An osteotome was utilized to resect the prominent lateral and distal aspect of the medial cuneiform.  An osteotome  was also utilized to resect chondral tissue from between the first and second metatarsal bases and at the Lisfranc joint.  The wound was copiously irrigated to remove all loose debris.    Pointed reduction forceps were utilized to reduce the Lisfranc complex.  In percutaneous fashion, a guidewire was advanced from the base of the medial cuneiform across the Lisfranc joint into the base of the second metatarsal.  Fluoroscopic imaging confirmed excellent eduction of the Lisfranc joint and appropriate positioning of the guidewire.    At this point, the first TMT arthrodesis was performed.  The jig for the [id number] Phantom Lapidus nail was secured in appropriate position and the nail was slightly internally rotated to allow for a transfixation screw to extend beyond the first metatarsal base into the base of the second metatarsal.  After confirming appropriate alignment of the nail, the nail was secured proximally and excellent compression was applied across the nail before securing the proximal interlocking screws.  As noted, the transverse screw through the distal end of the nail was advanced into the base of the second metatarsal as well to provide better stability across the Lisfranc complex.  Excellent compression was present across the first TMT joint with excellent correction of the intermetatarsal angle.  Fluoroscopic imaging confirmed appropriate positioning of the hardware.    The wire securing the Lisfranc joint was then overdrilled and the Lisfranc joint itself was secured with a fully threaded cannulated 4.0millimeter screw.  Appropriate positioning of the screw and stable reduction of the Lisfranc joint was noted under fluoroscopic imaging.    Due to persistent hallux valgus interphalangeus, an Akin osteotomy was completed.  A longitudinal incision was made along the medial aspect of the first MTP joint.  Soft tissue dissection was carefully carried down maintaining excellent hemostasis.  The first MTP  joint capsule was sharply incised.  The medial eminence was resected with an oscillating saw.  Hohmann retractors were placed around the base of the proximal phalanx and an oscillating saw was utilized to complete a 2 mm medially based closing wedge osteotomy of the base of the hallux proximal phalanx.  The osteotomy was closed down and secured with a [place] 28 2.0mm partially-threaded cannulated screw.  Fluoroscopic imaging demonstrated excellent positioning of the hardware and overall excellent correction of the hallux valgus deformity.    Wounds were copiously irrigated.  Bone graft from the reaming for the Lapidus nail was placed over the dorsal aspect of the intermetatarsal and Lisfranc joints to enhance healing.  Deep fascia over the dorsal midfoot incision was reapproximated with interrupted 2-0 Vicryl suture.  The first MTP joint capsule was imbricated in pants over vest fashion with 2-0 Vicryl suture.  Subcutaneous tissues and skin for all incisions were reapproximated with interrupted Monocryl and nylon sutures respectively.  Xeroform and sterile dressings were applied.  The patient was placed in a well-padded short leg splint.  The patient was brought to the PACU in stable condition for further postoperative care.    Postoperatively, the patient will limit weightbearing activity on the foot for the next 2 weeks in her splint.  The patient will be placed on aspirin for DVT prophylaxis.  The patient will return to clinic for follow-up in 2 weeks for repeat evaluation including wound check, suture removal, and nonweightbearing radiographs of the left foot.  The patient would be allowed to advance weightbearing on her heel only in a boot at that time until 6 weeks postoperatively.    Of note, all counts were correct at the conclusion of the case.

## 2021-07-13 NOTE — DISCHARGE INSTRUCTIONS
If you feel you have any needs or any sort of crisis you can call the Mental Health Crisis Line at [phone] or [facility] at [phone]. If you feel threatened you may call 9-1-1.     Same Day Surgery Discharge Instructions for  Sedation and General Anesthesia       It's not unusual to feel dizzy, light-headed or faint for up to 24 hours after surgery or while taking pain medication.  If you have these symptoms: sit for a few minutes before standing and have someone assist you when you get up to walk or use the bathroom.      You should rest and relax for the next 24 hours. We recommend you make arrangements to have an adult stay with you for at least 24 hours after your discharge.  Avoid hazardous and strenuous activity.      DO NOT DRIVE any vehicle or operate mechanical equipment for 24 hours following the end of your surgery.  Even though you may feel normal, your reactions may be affected by the medication you have received.      Do not drink alcoholic beverages for 24 hours following surgery.       Slowly progress to your regular diet as you feel able. It's not unusual to feel nauseated and/or vomit after receiving anesthesia.  If you develop these symptoms, drink clear liquids (apple juice, ginger ale, broth, 7-up, etc. ) until you feel better.  If your nausea and vomiting persists for 24 hours, please notify your surgeon.        All narcotic pain medications, along with inactivity and anesthesia, can cause constipation. Drinking plenty of liquids and increasing fiber intake will help.      For any questions of a medical nature, call your surgeon.      Do not make important decisions for 24 hours.      If you had general anesthesia, you may have a sore throat for a couple of days related to the breathing tube used during surgery.  You may use Cepacol lozenges to help with this discomfort.  If it worsens or if you develop a fever, contact your surgeon.       If you feel your pain is not well managed  with the pain medications prescribed by your surgeon, please contact your surgeon's office to let them know so they can address your concerns.       CoVid 19 Information    We want to give you information regarding Covid. Please consult your primary care provider with any questions you might have.     Patient who have symptoms (cough, fever, or shortness of breath), need to isolate for 7 days from when symptoms started OR 72 hours after fever resolves (without fever reducing medications) AND improvement of respiratory symptoms (whichever is longer).      Isolate yourself at home (in own room/own bathroom if possible)    Do Not allow any visitors    Do Not go to work or school    Do Not go to [employer],  centers, shopping, or other public places.    Do Not shake hands.    Avoid close and intimate contact with others (hugging, kissing).    Follow CDC recommendations for household cleaning of frequently touched services.     After the initial 7 days, continue to isolate yourself from household members as much as possible. To continue decrease the risk of community spread and exposure, you and any members of your household should limit activities in public for 14 days after starting home isolation.     You can reference the following CDC link for helpful home isolation/care tips:  https://www.cdc.gov/coronavirus/2019-ncov/downloads/10Things.pdf    Protect Others:    Cover Your Mouth and Nose with a mask, disposable tissue or wash cloth to avoid spreading germs to others.    Wash your hands and face frequently with soap and water    Call Your Primary Doctor If: Breathing difficulty develops or you become worse.    For more information about COVID19 and options for caring for yourself at home, please visit the CDC website at https://www.cdc.gov/coronavirus/2019-ncov/about/steps-when-sick.html  For more options for care at [facility], please visit our website at  [email or web address]                [facility]      DISCHARGE INSTRUCTIONS FOLLOWING   REGIONAL BLOCK ANESTHESIA      Numbness or lack of feeling in the arm/leg that was operated may last up to 24 hours.  The average time is usually 10-15 hours.  You may not be able to lift or move the arm or leg where the operation was by itself during that time.  Long-acting local anesthetic medicines were used to give you long-lasting pain relief.    Wear a sling until your arm is completely \"awake\"    Avoid bumping your arm, leg or foot while it is numb    Avoid extremes of hot or cold while it is numb    Remain quiet and restful the day of surgery.  Resume normal activities gradually over the next day or so as advise by your surgeon.    Do not drive or operate  Any machinery until your extremity is full  \"awake\"        You will have a tingling and prickly sensation in your arm/leg as the feeling begins to return; you can also expect some discomfort. The amount of discomfort is unpredictable, but if you have more pain that can be controlled with the pain medication you received, you should contact your surgeon.  Start to take your pain pills as soon as you start to feel any discomfort or pain.          Crutch Instructions      Because of your surgery you will need crutches.  Make sure you tell your healthcare provider if crutches do not seem to work for you.  Using Crutches   Crutches are the most commonly used device for injuries to the lower part of the body because they allow the most mobility. All walking assistance devices require strength in your upper body but crutches require more coordination. If you are unable to use crutches then a cane or walker may be better.   Remember these rules when using crutches:   Always look straight ahead when you walk. Do not look down.   Hold the top padded part of the crutches into your chest near your armpits. Grasp the padded hand  and " "always support your weight with your arms and hands.   Do not lean on the crutches with your armpit as this could cause nerve damage.  Standing Up  Make sure you are in a stable chair. Move forward to the edge of the chair so that your  good  foot is flat on the floor. Put both crutches together and hold the handgrips in one hand. Stretch the injured leg out straight and then use the crutches with one hand and the chair armrest with the other hand to push yourself into a standing position onto your  good  leg. Once you are standing, wait until you are steady before placing a crutch in each hand. Until you become good at standing, have someone assist you in case you lose your balance. When standing still, lean slightly forward with the crutches ahead of you and about 3 feet apart. Unless you are told otherwise, you should keep weight off your injured leg.  Walking  To begin crutch walking, balance yourself on your  good  leg. Move both crutches forward about the length of one step and place them firmly on the floor in front of you about 3 feet apart. Support your weight on the padded hand rests while leaning forward. Press the top of the crutches against your chest wall and not into your armpits. Be sure to hold the injured leg up off of the floor. When ready, swing the \"good\" leg forward about one step length past the crutches while supporting your weight on the padded hand . Be careful not to go too far. Transfer your weight onto the \"good\" leg then bring both crutches forward about the length of one step. Repeating this motion will allow you to move fairly quickly without the use of your injured leg. Keep practicing until you become good at crutch walking.  Sitting Down  Make sure the chair you are about to sit on is stable. Walk in front of the chair such that you are facing away from it. Move back a little at a time until the back of your  good  leg is nearly touching the seat. Keeping your weight on the " " good  leg, move both crutches to one hand holding onto the handgrips. Lean forward, bend your  good  knee, and move your injured leg out forward. Sit down slowly while using your free hand to reach for the chair s armrest for support. Place the crutches where you can easily get them. Never pivot, even on your  good  leg. This could cause you to fall or injure your  good  leg.  Navigating Stairs, Curbs & Door Steps  Only attempt this once you are very comfortable with regular crutch walking and only when someone is there to steady you should you begin to lose your balance. Hold on to a  railing with one hand and both crutches in the other hand or have someone carry the loose crutch for you. It does not matter which side the handrail is on. If there is no handrail, you can use both crutches. Using only crutches is the same as the railing method but maintaining your balance can be difficult. The crutch-only method is not recommended until you have become very good at crutch walking. Be sure to only take one step at a time. A simple rule to remember for crutches when navigating stairs or curbs: up with the  good  leg and down with the  bad .  Going Up Stairs or Curbs  Walk close to the first step with the crutches slightly behind you. Push down on the handrail and the crutch and step up with the \"good\" leg. You may have to make a short hop to do this if you are not allowed to place any weight on the injured leg. Lean forward and bring the injured leg and the crutch up beside the \"good\" leg. Repeat this until you have climbed up all of the steps. Remember, the \"good\" leg goes up first and the crutches move with the injured leg. The person helping you should stand behind and to your side that is away from the railing.  Going Down Stairs or Curbs  Walk to the edge of the first step. While standing and balancing on the  good  leg, place both crutches in one hand and then down on the step below. Be sure to " "support your weight by leaning on the crutches and handrail. Bring the injured leg forward, then the \"good\" leg down to the same step as the crutches. Remember crutches go down first with the injured leg. The person helping you should  front and to your side that is away from the railing.  Sitting Method for Navigating Stairs  A safer way to get up and down stairs is to sit down. To go up steps, sit down on the second or third step. Push with your  good  leg below while pushing up with both arms on the step above to move your bottom to the next step. When you get to the top of the stairs you may need to use the  scooting  method described later to get back up. To go down steps you first need to lower yourself to the floor near the top of the steps. Once seated, support yourself with your  good  leg on the second to next step below, and push with both arms on the step where you are sitting to move your bottom down to the next step. When you reach the bottom, pull yourself up to standing position using your crutches. It is helpful if someone can move your crutches and assist you in getting up and down. With practice it may be possible to manage on your own.  If You Start To Fall  If you start to fall and cannot get your balance, throw the crutches away from you. Try to fall onto your  good  side away from your injury and then break your fall with your arms. If uninjured, you can usually get back up by moving into a sitting position and scooting to a chair. Push with your arms and hands on the chair seat while pushing with the \"good\" leg to get up into the chair. You should not attempt to get back up if you are having significant pain. Call for assistance or dial 911 for an ambulance if necessary.  Safety Tips for Crutch Walking   At first you may want to wear a training belt (or a strong regular belt) so others can assist you.   Do not use crutches if you feel dizzy or drowsy.   Be careful on slick or wet " surfaces like a kitchen or bathroom floor, snow, ice, or rainy conditions.   Temporarily remove pets, throw rugs or other items from your home that might trip you.   Do not hop around while holding onto furniture.   Wear sneakers or rubber soled shoes that will not easily slip or come off.   Be careful on ramps or slopes as they can be harder to walk up or down   Do not remove any parts from your crutches especially the padding or rubber traction footings. Replace padding or footings that become damaged immediately.   It is important to use your crutches correctly. If you feel any numbness or tingling in your arms, you are probably using the crutches incorrectly.  Helpful Hints   A bedside toilet or toilet riser may be helpful.   Always allow for extra time to get around. Children in school should be given permission to leave class early to avoid crowds when changing classes.   Elevate the injured leg when sitting.   Use a backpack to carry books or waist pouch to carry other items so that both hands are free.   Call your healthcare provider if you have any questions or concerns.      Reasons to contact your surgeon:    1. Signs of possible infection: Check your incision daily for redness, swelling, warmth, red streaks or foul drainage.   2. Elevated temperature.  3. Pain not controlled with pain medication and/or rest.   4. Uncontrolled nausea or vomiting.  5. Any questions or concerns.           **If you have questions or concerns about your procedure, call   Dr. Aden at [phone].**

## 2021-07-13 NOTE — OR NURSING
Pt expresses no responsible adult to stay with pt at home. Dr. Polanco notified. Dr. Polanco to bedside.

## 2021-07-13 NOTE — ANESTHESIA PREPROCEDURE EVALUATION
Anesthesia Pre-Procedure Evaluation    Patient: Génesis Kernlrognena   MRN: [id number] : 1990        Preoperative Diagnosis: Fracture, multiple, closed and compound [T14.8XXA]   Procedure : Procedure(s):  OPEN REDUCTION INTERNAL FIXATION LEFT LISFRANC INJURY, BOAT AND AKIN OSTEOTOMIES  VERSUS LAPIDUS BUNIONECTOMY     Past Medical History:   Diagnosis Date     ADHD      Anxiety      Atypical squamous cells cannot exclude high grade squamous intraepithelial lesion on cytologic smear of cervix (ASC-H)      Bipolar 1 disorder (H)      Bipolar affective (H)      Depression      Exposure to syphilis      JAMAL (generalized anxiety disorder)      Hip pain     Right     Hx of gonorrhea      Hypothyroidism      Intussusception (H)      Lisfranc dislocation, left, initial encounter      Other chronic pain     uses cannabis     PMDD (premenstrual dysphoric disorder)      PMDD (premenstrual dysphoric disorder)      Postoperative back pain      STD (female)      Varicella       Past Surgical History:   Procedure Laterality Date     APPENDECTOMY       BACK SURGERY      Lumbar Spinal Fusion     BIOPSY CERVICAL, LOCAL EXCISION, SINGLE/MULTIPLE       colon surgery       DILATION AND CURETTAGE N/A 2018    Procedure: SUCTION DILATION AND CURETTAGE;  Surgeon: Jone Mtz MD;  Location: [facility] [place];  Service:      GYN SURGERY      LEEP     GA INSERT INTRAUTERINE DEVICE N/A 2018    Procedure: INSERTION OF INTRAUTERINE DEVICE;  Surgeon: Jone Mtz MD;  Location: [facility] [place];  Service: Gynecology      Allergies   Allergen Reactions     Sulfamethoxazole-Trimethoprim [Sulfamethoxazole W/Trimethoprim] Unknown     Pt. states she only gets a reaction when taken with pyridium     Lamictal [Lamotrigine] Rash      Social History     Tobacco Use     Smoking status: Current Every Day Smoker     Smokeless tobacco: Never Used     Tobacco comment: Vaping nicotine   Substance Use Topics     Alcohol use: Not on file       Wt Readings from Last 1 Encounters:   07/13/21 72.5 kg (159 lb 14.4 oz)        Anesthesia Evaluation   Pt has had prior anesthetic.     No history of anesthetic complications       ROS/MED HX  ENT/Pulmonary:    (-) tobacco use and sleep apnea   Neurologic:    (-) no seizures and no CVA   Cardiovascular:    (-) hypertension and dyslipidemia   METS/Exercise Tolerance:     Hematologic:       Musculoskeletal:   (+) fracture, Fracture location: LLE,     GI/Hepatic:    (-) GERD and liver disease   Renal/Genitourinary:    (-) renal disease   Endo:     (+) thyroid problem, hypothyroidism,  (-) Type II DM and obesity   Psychiatric/Substance Use:     (+) psychiatric history anxiety, depression and bipolar Recreational drug usage: Cannabis.    Infectious Disease:       Malignancy:       Other:      (+) , H/O Chronic Pain,        Physical Exam    Airway        Mallampati: II   TM distance: > 3 FB   Neck ROM: full   Mouth opening: > 3 cm    Respiratory Devices and Support         Dental  no notable dental history         Cardiovascular   cardiovascular exam normal          Pulmonary   pulmonary exam normal                OUTSIDE LABS:  CBC:   Lab Results   Component Value Date    HGB 12.4 09/28/2018     BMP:   Lab Results   Component Value Date     01/30/2021    POTASSIUM 3.8 01/30/2021    CHLORIDE 104 01/30/2021    CO2 27 01/30/2021    BUN 13 01/30/2021    CR 0.69 01/30/2021     01/30/2021     COAGS: No results found for: PTT, INR, FIBR  POC:   Lab Results   Component Value Date    HCG Negative 07/13/2021     HEPATIC: No results found for: ALBUMIN, PROTTOTAL, ALT, AST, GGT, ALKPHOS, BILITOTAL, BILIDIRECT, PER  OTHER:   Lab Results   Component Value Date    JOHNY 8.7 01/30/2021    TSH 0.66 01/30/2021       Anesthesia Plan    ASA Status:  2   NPO Status:  NPO Appropriate    Anesthesia Type: General.     - Airway: LMA   Induction: Intravenous.   Maintenance: Balanced.        Consents    Anesthesia Plan(s) and  associated risks, benefits, and realistic alternatives discussed. Questions answered and patient/representative(s) expressed understanding.     - Discussed with:  Patient         Postoperative Care    Pain management: Multi-modal analgesia, Peripheral nerve block (Single Shot).   PONV prophylaxis: Ondansetron (or other 5HT-3), Background Propofol Infusion, Dexamethasone or Solumedrol     Comments:                Twin Polanco MD

## 2021-07-13 NOTE — ANESTHESIA PROCEDURE NOTES
Ankle (4 nerve block) Procedure Note  Pre-Procedure   Staff -        Anesthesiologist:  Twin Polanco MD       Performed By: anesthesiologist       Location: pre-op       Pre-Anesthestic Checklist: patient identified, IV checked, site marked, risks and benefits discussed, informed consent, monitors and equipment checked, pre-op evaluation, at physician/surgeon's request and post-op pain management  Timeout:       Correct Patient: Yes        Correct Procedure: Yes        Correct Site: Yes        Correct Position: Yes        Correct Laterality: Yes        Site Marked: Yes  Procedure Documentation  Procedure: Ankle (4 nerve block)       Laterality: left       Patient Position: supine       Patient Prep/Sterile Barriers: sterile gloves, mask, patient draped       Skin prep: Chloraprep       Local skin infiltrated with 2 mL of 1% lidocaine.        Needle Type: other       Needle Gauge: 22.        Needle Length (millimeters): 50        Ultrasound guided       1. Ultrasound was used to identify targeted nerve, plexus, vascular marker, or fascial plane and place a needle adjacent to it in real-time.       2. Ultrasound was used to visualize the spread of anesthetic in close proximity to the above referenced structure.       3. A permanent image is entered into the patient's record.       4. The visualized anatomic structures appeared normal.       5. There were no apparent abnormal pathologic findings.    Assessment/Narrative         The placement was negative for: blood aspirated, painful injection and site bleeding       Paresthesias: No.     Bolus given via needle..        Secured via.        Insertion/Infusion Method: Single Shot       Complications: none    Medication(s) Administered   Bupivacaine 0.5% w/ 1:400K Epi (Injection), 30 mL  Medication Administration Time: 7/13/2021 12:41 PM    Comments:  Bupivacaine 0.5% with 1:400,000 epi 30ml   Patient sedated but commutative throughout the procedure.   Patient  tolerated well.    Ultrasound Interpretation, peripheral nerve block    1.  Under ultrasound guidance, the needle was inserted and placed in close proximity to the nerve.  2. Ultrasound was also used to visualize the spread of the anesthetic in close proximity to the nerve being blocked.  3. The nerve(s) appeared anatomically normal.   4. There were no apparent abnormal pathological findings.  5. A permanent ultrasound image was saved n the patient's record.    The surgeon has given a verbal order transferring this pt to me for a performance of regional analgesia block for post op pain control. It is requested of me because I am trained and qualifed to perform this block and the surgeon is nether trained nor qualified to perform this procedure.

## 2021-07-13 NOTE — PROGRESS NOTES
SPIRITUAL HEALTH SERVICES Progress Note  [facility] SD pre-op    Visited pt due to SH request from pt/staff prior to surgery. I spoke with pt nurse before visit and then introduced myself to pt. Génesis requested prayer for SH support and I offered prayer, words of blessing and of our care and support for her during surgery and at home for recovery and healing.    SHS remains available.      Lillian Carr  Chaplain Resident

## 2021-07-13 NOTE — ANESTHESIA POSTPROCEDURE EVALUATION
Patient: Génesis Esquivel    Procedure(s):  OPEN REDUCTION INTERNAL FIXATION LEFT LISFRANC INJURY  AND AKIN OSTEOTOMIES  LAPIDUS BUNIONECTOMY    Diagnosis:Fracture, multiple, closed and compound [T14.8XXA]  Diagnosis Additional Information: No value filed.    Anesthesia Type:  General    Note:  Disposition: Outpatient   Postop Pain Control: Uneventful            Sign Out: Well controlled pain   PONV: No   Neuro/Psych: Uneventful            Sign Out: Acceptable/Baseline neuro status   Airway/Respiratory: Uneventful            Sign Out: Acceptable/Baseline resp. status   CV/Hemodynamics: Uneventful            Sign Out: Acceptable CV status; No obvious hypovolemia; No obvious fluid overload   Other NRE: NONE   DID A NON-ROUTINE EVENT OCCUR? No           Last vitals:  Vitals:    07/13/21 1515 07/13/21 1530 07/13/21 1541   BP: 126/82 115/84    Pulse: 73 73 95   Resp: 10 12 19   Temp:      SpO2: 100% 100% 99%       Last vitals prior to Anesthesia Care Transfer:  CRNA VITALS  7/13/2021 1432 - 7/13/2021 1532      7/13/2021             Pulse:  73    SpO2:  99 %    Resp Rate (set):  10    EKG:  Sinus rhythm          Electronically Signed By: Twin Polanco MD  July 13, 2021  4:04 PM
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
## 2021-07-13 NOTE — ANESTHESIA CARE TRANSFER NOTE
Patient: Génesis Esquivel    Procedure(s):  OPEN REDUCTION INTERNAL FIXATION LEFT LISFRANC INJURY  AND AKIN OSTEOTOMIES  LAPIDUS BUNIONECTOMY    Diagnosis: Fracture, multiple, closed and compound [T14.8XXA]  Diagnosis Additional Information: No value filed.    Anesthesia Type:   General     Note:    Oropharynx: oropharynx clear of all foreign objects and spontaneously breathing  Level of Consciousness: drowsy  Oxygen Supplementation: face mask  Level of Supplemental Oxygen (L/min / FiO2): 8 LPM  Independent Airway: airway patency satisfactory and stable  Dentition: dentition unchanged  Vital Signs Stable: post-procedure vital signs reviewed and stable  Report to RN Given: handoff report given  Patient transferred to: PACU    Handoff Report: Identifed the Patient, Identified the Reponsible Provider, Reviewed the pertinent medical history, Discussed the surgical course, Reviewed Intra-OP anesthesia mangement and issues during anesthesia, Set expectations for post-procedure period and Allowed opportunity for questions and acknowledgement of understanding      Vitals: (Last set prior to Anesthesia Care Transfer)  CRNA VITALS  7/13/2021 1432 - 7/13/2021 1510      7/13/2021             Pulse:  73    SpO2:  99 %    Resp Rate (set):  10        Electronically Signed By: MICHELLE Langley CRNA  July 13, 2021  3:10 PM
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
## 2021-07-22 NOTE — ADDENDUM NOTE
Addendum  created 07/22/21 1201 by Twin Polanco MD    Clinical Note Signed, Diagnosis association updated, Intraprocedure Blocks edited

## 2021-08-06 ENCOUNTER — HOSPITAL ENCOUNTER (EMERGENCY)
Facility: HOSPITAL | Age: 31
Discharge: HOME | End: 2021-08-06
Admitting: EMERGENCY MEDICINE
Payer: COMMERCIAL

## 2021-08-06 VITALS
HEART RATE: 104 BPM | TEMPERATURE: 97.3 F | DIASTOLIC BLOOD PRESSURE: 81 MMHG | OXYGEN SATURATION: 100 % | SYSTOLIC BLOOD PRESSURE: 126 MMHG | RESPIRATION RATE: 16 BRPM

## 2021-08-06 DIAGNOSIS — L02.416 CELLULITIS AND ABSCESS OF LEFT LEG: ICD-10-CM

## 2021-08-06 DIAGNOSIS — L03.116 CELLULITIS AND ABSCESS OF LEFT LEG: ICD-10-CM

## 2021-08-06 PROCEDURE — 99282 EMERGENCY DEPT VISIT SF MDM: CPT

## 2021-08-06 RX ORDER — FLUCONAZOLE 150 MG/1
150 TABLET ORAL ONCE
Qty: 1 TABLET | Refills: 1 | Status: SHIPPED | OUTPATIENT
Start: 2021-08-06 | End: 2021-08-06

## 2021-08-06 RX ORDER — CEPHALEXIN 500 MG/1
500 CAPSULE ORAL 4 TIMES DAILY
Qty: 28 CAPSULE | Refills: 0 | Status: SHIPPED | OUTPATIENT
Start: 2021-08-06 | End: 2021-08-08

## 2021-08-06 RX ORDER — SULFAMETHOXAZOLE/TRIMETHOPRIM 800-160 MG
1 TABLET ORAL 2 TIMES DAILY
Qty: 14 TABLET | Refills: 0 | Status: SHIPPED | OUTPATIENT
Start: 2021-08-06 | End: 2021-08-13

## 2021-08-06 NOTE — ED TRIAGE NOTES
Patient arrives to complaint of worsening infection behind left leg. Reports that she also has infection under right arm pit that seems to be improving with antibiotics prescribed by PCP.

## 2021-08-06 NOTE — ED PROVIDER NOTES
EMERGENCY DEPARTMENT ENCOUNTER      NAME: Génesis Esquivel  AGE: 31 year old female  YOB: 1990  MRN: [id number]  EVALUATION DATE & TIME: No admission date for patient encounter.    PCP: Clinic, [facility]    ED PROVIDER: Anitha Becker MD    Chief Complaint   Patient presents with     Leg infection         FINAL IMPRESSION:  1. Cellulitis and abscess of left leg          ED COURSE & MEDICAL DECISION MAKING:    Pertinent Labs & Imaging studies reviewed. (See chart for details)  31 year old female with history of anxiety, bipolar disorder who presents to the Emergency Department for evaluation of concern for infected bug bites pointing to areas in her right axilla and left calf.  On exam it appears as though she has 3 cystic areas in her right axilla there are mildly erythematous but not indurated.  She does however have a indurated area on her left calf/popliteal fossa that is spontaneously draining a small amount of purulence.  There is no residual fluctuance.  Does not warrant I&D at this time.  We will increase her Keflex from 3 times daily to 4 times daily, add Bactrim as well as fluconazole to present yeast infection she has had issues with same in the past.  Patient was encouraged to apply warm compresses to the area.  Warning signs return to ED discussed.           2:45 PM I met with the patient, obtained history, performed an initial exam, and discussed options and plan for diagnostics and treatment here in the ED.    At the conclusion of the encounter I discussed the results of all of the tests and the disposition. The questions were answered. The patient or family acknowledged understanding and was agreeable with the care plan.    MEDICATIONS GIVEN IN THE EMERGENCY:  Medications - No data to display    NEW PRESCRIPTIONS STARTED AT TODAY'S ER VISIT  New Prescriptions    CEPHALEXIN (KEFLEX) 500 MG CAPSULE    Take 1 capsule (500 mg) by mouth 4 times daily for 7 doses    FLUCONAZOLE  (DIFLUCAN) 150 MG TABLET    Take 1 tablet (150 mg) by mouth once for 1 dose    SULFAMETHOXAZOLE-TRIMETHOPRIM (BACTRIM DS) 800-160 MG TABLET    Take 1 tablet by mouth 2 times daily for 7 days          =================================================================    HPI    Patient information was obtained from: patient     Use of Intrepreter: N/A      Génesis Esquivel is a 31 year old female with pertinent medical history of bipolar disorder, JAMAL, s/p appendectomy, and s/p lumbar spinal fusion who presents with a leg infection.     Patient reports infection on back of left leg that started spontaneously draining 2 days ago. She endorses severe pain that makes it difficult for her to walk. Prior to arrival to the ED, she took 4x ibuprofen and 2x Tylenol for the pain. Yesterday she visited her PCP who drained 2x abscesses under her right arm. She was prescribed Keflex and has taken 4x doses as of arrival to ED. Patient has soaked in the tub to help with the infection and pain. Patient had recent surgery on foot.       REVIEW OF SYSTEMS  Constitutional:  Denies fever, chills, weight loss or weakness  GI:  Denies abdominal pain, nausea, vomiting, diarrhea  Musculoskeletal: Recent foot fracture wearing cam boot  Skin:  Positive for leg infection, Denies rash, pallor  Neurologic:  Denies headache, focal weakness or sensory changes  All other systems negative unless noted in HPI.      PAST MEDICAL HISTORY:  Past Medical History:   Diagnosis Date     ADHD      Anxiety      Atypical squamous cells cannot exclude high grade squamous intraepithelial lesion on cytologic smear of cervix (ASC-H)      Bipolar 1 disorder (H)      Bipolar affective (H)      Depression      Exposure to syphilis      JAMAL (generalized anxiety disorder)      Hip pain     Right     Hx of gonorrhea      Hypothyroidism      Intussusception (H)      Lisfranc dislocation, left, initial encounter      Other chronic pain     uses cannabis     PMDD  (premenstrual dysphoric disorder)      PMDD (premenstrual dysphoric disorder)      Postoperative back pain      STD (female)      Varicella        PAST SURGICAL HISTORY:  Past Surgical History:   Procedure Laterality Date     APPENDECTOMY       BACK SURGERY      Lumbar Spinal Fusion     BIOPSY CERVICAL, LOCAL EXCISION, SINGLE/MULTIPLE       BUNIONECTOMY LAPIDUS WITH TARSAL METATARSAL (TMT) FUSION Left 7/13/2021    Procedure: LAPIDUS BUNIONECTOMY;  Surgeon: Mustapha Aden MD;  Location:  OR     colon surgery       DILATION AND CURETTAGE N/A 9/28/2018    Procedure: SUCTION DILATION AND CURETTAGE;  Surgeon: Jone Mtz MD;  Location: [facility];  Service:      GYN SURGERY      LEEP     OPEN REDUCTION INTERNAL FIXATION ANKLE Left 7/13/2021    Procedure: OPEN REDUCTION INTERNAL FIXATION LEFT LISFRANC INJURY  AND AKIN OSTEOTOMIES;  Surgeon: Mustapha Aden MD;  Location:  OR     IL INSERT INTRAUTERINE DEVICE N/A 9/28/2018    Procedure: INSERTION OF INTRAUTERINE DEVICE;  Surgeon: Jone Mtz MD;  Location: [facility];  Service: Gynecology       CURRENT MEDICATIONS:    Cannot display prior to admission medications because the patient has not been admitted in this contact.       ALLERGIES:  Allergies   Allergen Reactions     Sulfamethoxazole-Trimethoprim [Sulfamethoxazole W/Trimethoprim] Unknown     Pt. states she only gets a reaction when taken with pyridium     Lamictal [Lamotrigine] Rash       FAMILY HISTORY:  No family history on file.    SOCIAL HISTORY:  Social History     Tobacco Use     Smoking status: Current Every Day Smoker     Smokeless tobacco: Never Used     Tobacco comment: Vaping nicotine   Substance Use Topics     Alcohol use: Not on file     Drug use: Yes     Types: Marijuana, Methamphetamines     Comment: cannibus medical use for chronic pain        VITALS:  Patient Vitals for the past 24 hrs:   BP Temp Temp src Pulse Resp SpO2   08/06/21 1452 126/81 97.3  F  (36.3  C) Oral 104 16 100 %       PHYSICAL EXAM    General Appearance: Well-appearing, well-nourished, no acute distress   Head:  Normocephalic  Eyes:   conjunctiva/corneas clear  ENT:   membranes are moist without pallor  Neck:  Supple  Cardio:  Regular rate and rhythm  Pulm:  No respiratory distress  Extremities:  Extremities normal, atraumatic, no cyanosis or edema, full ROM and motor tone intact, bilateral pulses intact upper and lower, 3 erythematous nodular areas under right axilla, none currently draining. CAM boot on left foot. 3x4 cm erythematous area by right lateral popliteal fossa spontaneously draining purulence.   Skin:  Skin warm, dry, no rashes  Neuro:  Alert and oriented ×3    The creation of this record is based on the scribe s observations of the work being performed by Anitha Becker MD and the provider s statements to them. It was created on his behalf by Mandeep Low, a trained medical scribe. This document has been checked and approved by the attending provider.    Anitha Becker MD  Emergency Medicine  [facility] EMERGENCY DEPARTMENT  [address] [place] [id number] [phone]  Dept: [phone]     Anitha Becker MD  08/06/21 1264

## 2021-08-06 NOTE — DISCHARGE INSTRUCTIONS
Take antibiotics as prescribed.  Increase Keflex from 3 times a day to 4 times a day.  Add Bactrim twice a day.  Fluconazole now to prevent yeast infection.  You can get a refill in a week if you start developing any symptoms.  Apply warm packs to the area.  Continue to express any purulence.

## 2021-08-22 ENCOUNTER — HEALTH MAINTENANCE LETTER (OUTPATIENT)
Age: 31
End: 2021-08-22

## 2021-10-17 ENCOUNTER — HEALTH MAINTENANCE LETTER (OUTPATIENT)
Age: 31
End: 2021-10-17

## 2021-11-04 ENCOUNTER — HOSPITAL ENCOUNTER (EMERGENCY)
Facility: HOSPITAL | Age: 31
Discharge: HOME | End: 2021-11-04
Admitting: PHYSICIAN ASSISTANT
Payer: COMMERCIAL

## 2021-11-04 VITALS
RESPIRATION RATE: 18 BRPM | DIASTOLIC BLOOD PRESSURE: 73 MMHG | OXYGEN SATURATION: 99 % | HEART RATE: 98 BPM | HEIGHT: 68 IN | BODY MASS INDEX: 25.76 KG/M2 | WEIGHT: 170 LBS | SYSTOLIC BLOOD PRESSURE: 109 MMHG | TEMPERATURE: 97.7 F

## 2021-11-04 DIAGNOSIS — L03.213 PERIORBITAL CELLULITIS: ICD-10-CM

## 2021-11-04 DIAGNOSIS — H10.9 CONJUNCTIVITIS: ICD-10-CM

## 2021-11-04 PROCEDURE — 99283 EMERGENCY DEPT VISIT LOW MDM: CPT

## 2021-11-04 RX ORDER — ERYTHROMYCIN 5 MG/G
0.5 OINTMENT OPHTHALMIC 4 TIMES DAILY
Qty: 3.5 G | Refills: 0 | Status: SHIPPED | OUTPATIENT
Start: 2021-11-04

## 2021-11-04 RX ORDER — TETRACAINE HYDROCHLORIDE 5 MG/ML
1-2 SOLUTION OPHTHALMIC ONCE
Status: DISCONTINUED | OUTPATIENT
Start: 2021-11-04 | End: 2021-11-05 | Stop reason: HOSPADM

## 2021-11-04 ASSESSMENT — MIFFLIN-ST. JEOR: SCORE: 1524.61

## 2021-11-05 NOTE — ED PROVIDER NOTES
ED PROVIDER NOTE    EMERGENCY DEPARTMENT ENCOUNTER      NAME: Génesis Esquivel  AGE: 31 year old female  YOB: 1990  MRN: [id number]  EVALUATION DATE & TIME: 2021 11:10 PM    PCP: No Ref-Primary, Physician    ED PROVIDER: Barbara Call PA-C      Chief Complaint   Patient presents with     eye pain/redness/eyelid swelling         FINAL IMPRESSION:  1. Conjunctivitis    2. Periorbital cellulitis          MEDICAL DECISION MAKIN year old female with a h/o anxiety, seizures, hypothyroid presents to the Emergency Department for evaluation of a itchy, red left eye.  Vision is a little blurry but overall unchanged.  She does not wear contacts or glasses.    Vitals are stable, she is afebrile.  She has some mild to moderate conjunctivitis with chemosis of the left eye but no foreign body, corneal abrasions.  Her vision was normal.  This does not appear to be an emergent process such as open globe, acute angle glaucoma.  There is some mild erythema around the eye but she notes she has been rubbing it frequently. Nothing to suggest orbital cellulitis. I do not feel imaging is necessary.  This could be a little bit of periorbital cellulitis as well.    I am going to start the patient on Augmentin and erythromycin ointment.  Encourage close follow-up with PCP and the eye doctor.  Signs and symptoms that should prompt return to the ER were explained. Patient understood and was comfortable with plan.     At the conclusion of the encounter I discussed the results of all of the tests and the disposition. The questions were answered. The patient or family acknowledged understanding and was agreeable with the care plan.           MEDICATIONS GIVEN IN THE EMERGENCY:  Medications   tetracaine (PONTOCAINE) 0.5 % ophthalmic solution 1-2 drop (has no administration in time range)   fluorescein (FUL-ANAY) ophthalmic strip STRP 600 mcg (has no administration in time range)       NEW PRESCRIPTIONS STARTED AT TODAY'S  ER VISIT  Discharge Medication List as of 11/4/2021 11:25 PM      START taking these medications    Details   amoxicillin-clavulanate (AUGMENTIN) 875-125 MG tablet Take 1 tablet by mouth 2 times daily for 7 days, Disp-14 tablet, R-0, Local Print      erythromycin (ROMYCIN) 5 MG/GM ophthalmic ointment Place 0.5 inches Into the left eye 4 times dailyDisp-3.5 g, R-0Local Print                =================================================================    HPI    Patient information was obtained from: Patient      Génesis Esquivel is a 31 year old female who presents for a red, itchy left eye.  Symptoms started 1 week ago with some itching.  The last few days it has been more red.  Symptoms have been constant in nature and not any better or worse in the last couple days.  Does not recall getting anything in the eye.  She does not wear contacts or glasses.        REVIEW OF SYSTEMS   Constitutional: Negative for fevers, chills.  Vision: + left eye redness  HENT:  Negative for congestion, sore throat  Pulmonary: Negative for shortness of breath  Cardiac: Negative for chest pain  GI:Negative for nausea, vomiting, diarrhea, abdominal pain  : Negative for urinary symptoms  Musculoskeletal: Negative for extremity pain  Skin: Negative for skin changes  Neuro: Negative for weakness, numbness    All other systems reviewed and are negative        PAST MEDICAL HISTORY:  Hypothyroid  Anxiety  Seizures    PAST SURGICAL HISTORY:  No past surgical history on file.        CURRENT MEDICATIONS:      Current Facility-Administered Medications:      fluorescein (FUL-ANAY) ophthalmic strip STRP 600 mcg, 1 strip, Left Eye, Once, Barbara Call PA-C     tetracaine (PONTOCAINE) 0.5 % ophthalmic solution 1-2 drop, 1-2 drop, Left Eye, Once, Barbara Call PA-C    Current Outpatient Medications:      amoxicillin-clavulanate (AUGMENTIN) 875-125 MG tablet, Take 1 tablet by mouth 2 times daily for 7 days, Disp: 14 tablet, Rfl: 0      "erythromycin (ROMYCIN) 5 MG/GM ophthalmic ointment, Place 0.5 inches Into the left eye 4 times daily, Disp: 3.5 g, Rfl: 0    ALLERGIES:  Allergies   Allergen Reactions     Sulfa Drugs      Sulfamethoxazole-Trimethoprim      Pt. states she only gets a reaction when taken with pyridium     Lamotrigine Rash       FAMILY HISTORY:  No family history on file.    SOCIAL HISTORY:   Smoking: Denies  Alcohol: Denies    VITALS:  Vitals:    11/04/21 2153   BP: 109/73   Pulse: 98   Resp: 18   Temp: 97.7  F (36.5  C)   TempSrc: Temporal   SpO2: 99%   Weight: 77.1 kg (170 lb)   Height: 1.727 m (5' 8\")       PHYSICAL EXAM    General Appearance:  Alert, cooperative, no distress, appears stated age  HENT: Normocephalic without obvious deformity, atraumatic. Mucous membranes moist   Eyes: Moderate conjunctival injection with chemosis of the left eye. No foreign body. No corneal lesions/abrasion. PERRL. EOMI. Mild erythema around the left eye (possibily from rubbing?)   Respiratory: No distress. Lungs clear to ausculation bilaterally. No wheezes, rhonchi or stridor  Cardiovascular: Regular rate and rhythm, no murmur.   Musculoskeletal: Moving all extremities. No gross deformities  Integument: Warm, dry, no rashes or lesions  Neurologic: Alert and orientated x3. No focal deficits.  Psych: Normal mood and affect          Barbara Call PA-C   Emergency Medicine       Barbara Call PA-C  11/04/21 6147    "

## 2021-11-05 NOTE — ED TRIAGE NOTES
Pt reports left eyelid swelling for the last week that has gotten progressively worse. Also is reddened with clear drainage with blurred vission.

## 2021-11-05 NOTE — DISCHARGE INSTRUCTIONS
We are going to start you on antibiotic ointment and oral antibiotics for your eye to cover any infection.    Avoid putting anything else in the eye.    Recommend follow up with your primary care provider or eye clinic in the next few days.      Return to the ER if you have worsening pain, vision changes, increased swelling or redness or fevers.

## 2022-11-20 ENCOUNTER — HEALTH MAINTENANCE LETTER (OUTPATIENT)
Age: 32
End: 2022-11-20

## 2023-11-25 ENCOUNTER — HEALTH MAINTENANCE LETTER (OUTPATIENT)
Age: 33
End: 2023-11-25

## (undated) DEVICE — BNDG ELASTIC 3"X5YDS STERILE 6611-3S

## (undated) DEVICE — Device

## (undated) DEVICE — SU MONOCRYL 3-0 PS-2 27" Y427H

## (undated) DEVICE — SU VICRYL 2-0 CT-1 27" UND J259H

## (undated) DEVICE — PACK EXTREMITY SOP15EXFSD

## (undated) DEVICE — DRSG XEROFORM 1X8"

## (undated) DEVICE — CAST PADDING 4" UNSTERILE 9044

## (undated) DEVICE — SUCTION CANISTER MEDIVAC LINER 3000ML W/LID 65651-530

## (undated) DEVICE — SU ETHILON 4-0 FS-2 18" 662H

## (undated) DEVICE — SU ETHILON 3-0 FS-1 18" 663G

## (undated) DEVICE — CAST PLASTER SPLINT 5X30" 7395

## (undated) DEVICE — SU VICRYL 2-0 CT-2 27" UND J269H

## (undated) DEVICE — CAST PADDING 3" STERILE 03234-24

## (undated) DEVICE — GLOVE SENSICARE PI ORTHO PF 7.5 LATEX FREE MSG9475

## (undated) DEVICE — DRAPE MINI C-ARM 4003

## (undated) DEVICE — DRSG XEROFORM 5X9" 8884431605

## (undated) DEVICE — DRAPE IOBAN INCISE 23X17" 6650EZ

## (undated) DEVICE — K-WIRE

## (undated) DEVICE — GLOVE PROTEXIS POWDER FREE 7.5 ORTHOPEDIC 2D73ET75

## (undated) DEVICE — BLADE SAW OSCIL/SAG STRK MICRO 5.5X25X0.38MM 2296-003-524

## (undated) DEVICE — SOL WATER IRRIG 1000ML BOTTLE 2F7114

## (undated) DEVICE — ESU GROUND PAD ADULT W/CORD E7507

## (undated) DEVICE — DRSG GAUZE 4X4" 2187

## (undated) DEVICE — GOWN IMPERVIOUS SPECIALTY XLG/XLONG 32474

## (undated) DEVICE — GLOVE PROTEXIS BLUE W/NEU-THERA 8.0  2D73EB80

## (undated) DEVICE — BNDG ELASTIC 4" DBL LENGTH UNSTERILE 6611-14

## (undated) DEVICE — GUIDE PIN

## (undated) DEVICE — DRAPE STERI U 1015

## (undated) DEVICE — PERFORATOR BONE FENESTRATION P99-100-2009

## (undated) DEVICE — SU MONOCRYL 4-0 PS-2 18" UND Y496G

## (undated) DEVICE — GLOVE PROTEXIS POWDER FREE 8.0 ORTHOPEDIC 2D73ET80

## (undated) DEVICE — PREP CHLORAPREP 26ML TINTED ORANGE  260815

## (undated) DEVICE — LINEN TOWEL PACK X5 5464

## (undated) DEVICE — IMM LIMB ELEVATOR DC40-0203

## (undated) RX ORDER — PROPOFOL 10 MG/ML
INJECTION, EMULSION INTRAVENOUS
Status: DISPENSED
Start: 2021-07-13

## (undated) RX ORDER — ONDANSETRON 2 MG/ML
INJECTION INTRAMUSCULAR; INTRAVENOUS
Status: DISPENSED
Start: 2021-07-13

## (undated) RX ORDER — LIDOCAINE HYDROCHLORIDE 20 MG/ML
INJECTION, SOLUTION EPIDURAL; INFILTRATION; INTRACAUDAL; PERINEURAL
Status: DISPENSED
Start: 2021-07-13

## (undated) RX ORDER — CEFAZOLIN SODIUM 1 G/3ML
INJECTION, POWDER, FOR SOLUTION INTRAMUSCULAR; INTRAVENOUS
Status: DISPENSED
Start: 2021-07-13

## (undated) RX ORDER — OXYCODONE HYDROCHLORIDE 5 MG/1
TABLET ORAL
Status: DISPENSED
Start: 2021-07-13

## (undated) RX ORDER — DEXAMETHASONE SODIUM PHOSPHATE 4 MG/ML
INJECTION, SOLUTION INTRA-ARTICULAR; INTRALESIONAL; INTRAMUSCULAR; INTRAVENOUS; SOFT TISSUE
Status: DISPENSED
Start: 2021-07-13

## (undated) RX ORDER — FENTANYL CITRATE 0.05 MG/ML
INJECTION, SOLUTION INTRAMUSCULAR; INTRAVENOUS
Status: DISPENSED
Start: 2021-07-13

## (undated) RX ORDER — FENTANYL CITRATE 50 UG/ML
INJECTION, SOLUTION INTRAMUSCULAR; INTRAVENOUS
Status: DISPENSED
Start: 2021-07-13